# Patient Record
Sex: FEMALE | Race: WHITE | URBAN - METROPOLITAN AREA
[De-identification: names, ages, dates, MRNs, and addresses within clinical notes are randomized per-mention and may not be internally consistent; named-entity substitution may affect disease eponyms.]

---

## 2017-01-01 ENCOUNTER — APPOINTMENT (OUTPATIENT)
Dept: CT IMAGING | Facility: CLINIC | Age: 82
End: 2017-01-01
Attending: FAMILY MEDICINE
Payer: MEDICARE

## 2017-01-01 ENCOUNTER — APPOINTMENT (OUTPATIENT)
Dept: OCCUPATIONAL THERAPY | Facility: CLINIC | Age: 82
End: 2017-01-01
Attending: NURSE PRACTITIONER
Payer: MEDICARE

## 2017-01-01 ENCOUNTER — APPOINTMENT (OUTPATIENT)
Dept: GENERAL RADIOLOGY | Facility: CLINIC | Age: 82
End: 2017-01-01
Attending: FAMILY MEDICINE
Payer: MEDICARE

## 2017-01-01 ENCOUNTER — HOSPITAL ENCOUNTER (OUTPATIENT)
Facility: CLINIC | Age: 82
Setting detail: OBSERVATION
Discharge: HOME | End: 2017-06-04
Attending: FAMILY MEDICINE | Admitting: INTERNAL MEDICINE
Payer: MEDICARE

## 2017-01-01 VITALS
HEART RATE: 91 BPM | SYSTOLIC BLOOD PRESSURE: 140 MMHG | RESPIRATION RATE: 16 BRPM | TEMPERATURE: 97.7 F | WEIGHT: 102.38 LBS | BODY MASS INDEX: 17.57 KG/M2 | DIASTOLIC BLOOD PRESSURE: 80 MMHG | OXYGEN SATURATION: 100 %

## 2017-01-01 DIAGNOSIS — M62.81 GENERALIZED MUSCLE WEAKNESS: ICD-10-CM

## 2017-01-01 DIAGNOSIS — E86.0 DEHYDRATION: ICD-10-CM

## 2017-01-01 DIAGNOSIS — I48.92 ATRIAL FLUTTER, UNSPECIFIED TYPE (H): ICD-10-CM

## 2017-01-01 DIAGNOSIS — R53.1 WEAKNESS GENERALIZED: Primary | ICD-10-CM

## 2017-01-01 DIAGNOSIS — Z91.81 HISTORY OF RECENT FALL: ICD-10-CM

## 2017-01-01 LAB
ALBUMIN SERPL-MCNC: 3.3 G/DL (ref 3.4–5)
ALBUMIN UR-MCNC: 30 MG/DL
ALP SERPL-CCNC: 72 U/L (ref 40–150)
ALT SERPL W P-5'-P-CCNC: 37 U/L (ref 0–50)
ANION GAP SERPL CALCULATED.3IONS-SCNC: 8 MMOL/L (ref 3–14)
ANION GAP SERPL CALCULATED.3IONS-SCNC: 8 MMOL/L (ref 3–14)
ANION GAP SERPL CALCULATED.3IONS-SCNC: 9 MMOL/L (ref 3–14)
APPEARANCE UR: CLEAR
APTT PPP: 33 SEC (ref 22–37)
AST SERPL W P-5'-P-CCNC: 25 U/L (ref 0–45)
BACTERIA #/AREA URNS HPF: ABNORMAL /HPF
BACTERIA SPEC CULT: NO GROWTH
BACTERIA SPEC CULT: NO GROWTH
BASOPHILS # BLD AUTO: 0 10E9/L (ref 0–0.2)
BASOPHILS # BLD AUTO: 0 10E9/L (ref 0–0.2)
BASOPHILS NFR BLD AUTO: 0.2 %
BASOPHILS NFR BLD AUTO: 0.2 %
BILIRUB SERPL-MCNC: 0.9 MG/DL (ref 0.2–1.3)
BILIRUB UR QL STRIP: NEGATIVE
BUN SERPL-MCNC: 26 MG/DL (ref 7–30)
BUN SERPL-MCNC: 33 MG/DL (ref 7–30)
BUN SERPL-MCNC: 41 MG/DL (ref 7–30)
CALCIUM SERPL-MCNC: 8.2 MG/DL (ref 8.5–10.1)
CALCIUM SERPL-MCNC: 8.5 MG/DL (ref 8.5–10.1)
CALCIUM SERPL-MCNC: 9.6 MG/DL (ref 8.5–10.1)
CHLORIDE SERPL-SCNC: 102 MMOL/L (ref 94–109)
CHLORIDE SERPL-SCNC: 106 MMOL/L (ref 94–109)
CHLORIDE SERPL-SCNC: 108 MMOL/L (ref 94–109)
CO2 SERPL-SCNC: 24 MMOL/L (ref 20–32)
CO2 SERPL-SCNC: 24 MMOL/L (ref 20–32)
CO2 SERPL-SCNC: 31 MMOL/L (ref 20–32)
COLOR UR AUTO: YELLOW
CREAT SERPL-MCNC: 0.75 MG/DL (ref 0.52–1.04)
CREAT SERPL-MCNC: 0.75 MG/DL (ref 0.52–1.04)
CREAT SERPL-MCNC: 0.88 MG/DL (ref 0.52–1.04)
DIFFERENTIAL METHOD BLD: ABNORMAL
DIFFERENTIAL METHOD BLD: ABNORMAL
EOSINOPHIL # BLD AUTO: 0 10E9/L (ref 0–0.7)
EOSINOPHIL # BLD AUTO: 0 10E9/L (ref 0–0.7)
EOSINOPHIL NFR BLD AUTO: 0 %
EOSINOPHIL NFR BLD AUTO: 0 %
ERYTHROCYTE [DISTWIDTH] IN BLOOD BY AUTOMATED COUNT: 13.2 % (ref 10–15)
ERYTHROCYTE [DISTWIDTH] IN BLOOD BY AUTOMATED COUNT: 13.8 % (ref 10–15)
GFR SERPL CREATININE-BSD FRML MDRD: 60 ML/MIN/1.7M2
GFR SERPL CREATININE-BSD FRML MDRD: 71 ML/MIN/1.7M2
GFR SERPL CREATININE-BSD FRML MDRD: 72 ML/MIN/1.7M2
GLUCOSE SERPL-MCNC: 100 MG/DL (ref 70–99)
GLUCOSE SERPL-MCNC: 102 MG/DL (ref 70–99)
GLUCOSE SERPL-MCNC: 113 MG/DL (ref 70–99)
GLUCOSE UR STRIP-MCNC: NEGATIVE MG/DL
HCT VFR BLD AUTO: 33.6 % (ref 35–47)
HCT VFR BLD AUTO: 42.2 % (ref 35–47)
HGB BLD-MCNC: 11.4 G/DL (ref 11.7–15.7)
HGB BLD-MCNC: 14.7 G/DL (ref 11.7–15.7)
HGB UR QL STRIP: ABNORMAL
HYALINE CASTS #/AREA URNS LPF: 1 /LPF (ref 0–2)
IMM GRANULOCYTES # BLD: 0 10E9/L (ref 0–0.4)
IMM GRANULOCYTES # BLD: 0 10E9/L (ref 0–0.4)
IMM GRANULOCYTES NFR BLD: 0.3 %
IMM GRANULOCYTES NFR BLD: 0.3 %
INR PPP: 1 (ref 0.86–1.14)
INTERPRETATION ECG - MUSE: NORMAL
KETONES UR STRIP-MCNC: NEGATIVE MG/DL
LACTATE BLD-SCNC: 1.7 MMOL/L (ref 0.7–2.1)
LEUKOCYTE ESTERASE UR QL STRIP: NEGATIVE
LYMPHOCYTES # BLD AUTO: 0.5 10E9/L (ref 0.8–5.3)
LYMPHOCYTES # BLD AUTO: 0.7 10E9/L (ref 0.8–5.3)
LYMPHOCYTES NFR BLD AUTO: 10.7 %
LYMPHOCYTES NFR BLD AUTO: 7.7 %
Lab: NORMAL
MAGNESIUM SERPL-MCNC: 1.6 MG/DL (ref 1.6–2.3)
MCH RBC QN AUTO: 29.9 PG (ref 26.5–33)
MCH RBC QN AUTO: 30.9 PG (ref 26.5–33)
MCHC RBC AUTO-ENTMCNC: 33.9 G/DL (ref 31.5–36.5)
MCHC RBC AUTO-ENTMCNC: 34.8 G/DL (ref 31.5–36.5)
MCV RBC AUTO: 88 FL (ref 78–100)
MCV RBC AUTO: 89 FL (ref 78–100)
MICRO REPORT STATUS: NORMAL
MICRO REPORT STATUS: NORMAL
MONOCYTES # BLD AUTO: 0.5 10E9/L (ref 0–1.3)
MONOCYTES # BLD AUTO: 0.9 10E9/L (ref 0–1.3)
MONOCYTES NFR BLD AUTO: 12.9 %
MONOCYTES NFR BLD AUTO: 8.1 %
NEUTROPHILS # BLD AUTO: 5 10E9/L (ref 1.6–8.3)
NEUTROPHILS # BLD AUTO: 5.5 10E9/L (ref 1.6–8.3)
NEUTROPHILS NFR BLD AUTO: 75.9 %
NEUTROPHILS NFR BLD AUTO: 83.7 %
NITRATE UR QL: NEGATIVE
NRBC # BLD AUTO: 0 10*3/UL
NRBC # BLD AUTO: 0 10*3/UL
NRBC BLD AUTO-RTO: 0 /100
NRBC BLD AUTO-RTO: 0 /100
PH UR STRIP: 6 PH (ref 5–7)
PLATELET # BLD AUTO: 169 10E9/L (ref 150–450)
PLATELET # BLD AUTO: 176 10E9/L (ref 150–450)
POTASSIUM SERPL-SCNC: 3.1 MMOL/L (ref 3.4–5.3)
POTASSIUM SERPL-SCNC: 3.4 MMOL/L (ref 3.4–5.3)
POTASSIUM SERPL-SCNC: 3.6 MMOL/L (ref 3.4–5.3)
POTASSIUM SERPL-SCNC: 4.4 MMOL/L (ref 3.4–5.3)
PROT SERPL-MCNC: 7.5 G/DL (ref 6.8–8.8)
RBC # BLD AUTO: 3.81 10E12/L (ref 3.8–5.2)
RBC # BLD AUTO: 4.75 10E12/L (ref 3.8–5.2)
RBC #/AREA URNS AUTO: 1 /HPF (ref 0–2)
SODIUM SERPL-SCNC: 140 MMOL/L (ref 133–144)
SODIUM SERPL-SCNC: 140 MMOL/L (ref 133–144)
SODIUM SERPL-SCNC: 141 MMOL/L (ref 133–144)
SP GR UR STRIP: 1.02 (ref 1–1.03)
SPECIMEN SOURCE: NORMAL
SPECIMEN SOURCE: NORMAL
SQUAMOUS #/AREA URNS AUTO: <1 /HPF (ref 0–1)
TROPONIN I SERPL-MCNC: NORMAL UG/L (ref 0–0.04)
TSH SERPL DL<=0.005 MIU/L-ACNC: 1.2 MU/L (ref 0.4–4)
URN SPEC COLLECT METH UR: ABNORMAL
UROBILINOGEN UR STRIP-MCNC: NORMAL MG/DL (ref 0–2)
WBC # BLD AUTO: 6.5 10E9/L (ref 4–11)
WBC # BLD AUTO: 6.6 10E9/L (ref 4–11)
WBC #/AREA URNS AUTO: 1 /HPF (ref 0–2)

## 2017-01-01 PROCEDURE — 83735 ASSAY OF MAGNESIUM: CPT | Performed by: NURSE PRACTITIONER

## 2017-01-01 PROCEDURE — 99225 ZZC SUBSEQUENT OBSERVATION CARE,LEVEL II: CPT | Mod: Z6 | Performed by: EMERGENCY MEDICINE

## 2017-01-01 PROCEDURE — 40000141 ZZH STATISTIC PERIPHERAL IV START W/O US GUIDANCE

## 2017-01-01 PROCEDURE — 80048 BASIC METABOLIC PNL TOTAL CA: CPT | Performed by: NURSE PRACTITIONER

## 2017-01-01 PROCEDURE — G0378 HOSPITAL OBSERVATION PER HR: HCPCS

## 2017-01-01 PROCEDURE — 84484 ASSAY OF TROPONIN QUANT: CPT | Performed by: FAMILY MEDICINE

## 2017-01-01 PROCEDURE — 96361 HYDRATE IV INFUSION ADD-ON: CPT

## 2017-01-01 PROCEDURE — 83605 ASSAY OF LACTIC ACID: CPT | Performed by: FAMILY MEDICINE

## 2017-01-01 PROCEDURE — 84132 ASSAY OF SERUM POTASSIUM: CPT | Performed by: INTERNAL MEDICINE

## 2017-01-01 PROCEDURE — 73110 X-RAY EXAM OF WRIST: CPT | Mod: RT

## 2017-01-01 PROCEDURE — 40000164 ZZH STATISTIC PHYSICIAN TLC VISIT: Performed by: INTERNAL MEDICINE

## 2017-01-01 PROCEDURE — 25000128 H RX IP 250 OP 636: Performed by: NURSE PRACTITIONER

## 2017-01-01 PROCEDURE — 25000132 ZZH RX MED GY IP 250 OP 250 PS 637: Performed by: NURSE PRACTITIONER

## 2017-01-01 PROCEDURE — 85730 THROMBOPLASTIN TIME PARTIAL: CPT | Performed by: FAMILY MEDICINE

## 2017-01-01 PROCEDURE — A9270 NON-COVERED ITEM OR SERVICE: HCPCS | Mod: GY | Performed by: NURSE PRACTITIONER

## 2017-01-01 PROCEDURE — 97166 OT EVAL MOD COMPLEX 45 MIN: CPT | Mod: GO | Performed by: OCCUPATIONAL THERAPIST

## 2017-01-01 PROCEDURE — 99285 EMERGENCY DEPT VISIT HI MDM: CPT | Mod: 25

## 2017-01-01 PROCEDURE — 84443 ASSAY THYROID STIM HORMONE: CPT | Performed by: FAMILY MEDICINE

## 2017-01-01 PROCEDURE — 87086 URINE CULTURE/COLONY COUNT: CPT | Performed by: FAMILY MEDICINE

## 2017-01-01 PROCEDURE — 99217 ZZC OBSERVATION CARE DISCHARGE: CPT | Mod: Z6 | Performed by: EMERGENCY MEDICINE

## 2017-01-01 PROCEDURE — 71010 XR CHEST 1 VW: CPT

## 2017-01-01 PROCEDURE — 99207 ZZC APP CREDIT; MD BILLING SHARED VISIT: CPT | Mod: Z6 | Performed by: FAMILY MEDICINE

## 2017-01-01 PROCEDURE — 80053 COMPREHEN METABOLIC PANEL: CPT | Performed by: FAMILY MEDICINE

## 2017-01-01 PROCEDURE — 96360 HYDRATION IV INFUSION INIT: CPT

## 2017-01-01 PROCEDURE — 40000133 ZZH STATISTIC OT WARD VISIT: Performed by: OCCUPATIONAL THERAPIST

## 2017-01-01 PROCEDURE — 85610 PROTHROMBIN TIME: CPT | Performed by: FAMILY MEDICINE

## 2017-01-01 PROCEDURE — 25000128 H RX IP 250 OP 636: Performed by: FAMILY MEDICINE

## 2017-01-01 PROCEDURE — 85025 COMPLETE CBC W/AUTO DIFF WBC: CPT | Performed by: FAMILY MEDICINE

## 2017-01-01 PROCEDURE — 97530 THERAPEUTIC ACTIVITIES: CPT | Mod: GO | Performed by: OCCUPATIONAL THERAPIST

## 2017-01-01 PROCEDURE — 25000132 ZZH RX MED GY IP 250 OP 250 PS 637: Mod: GY | Performed by: NURSE PRACTITIONER

## 2017-01-01 PROCEDURE — 36415 COLL VENOUS BLD VENIPUNCTURE: CPT | Performed by: NURSE PRACTITIONER

## 2017-01-01 PROCEDURE — 81001 URINALYSIS AUTO W/SCOPE: CPT | Performed by: FAMILY MEDICINE

## 2017-01-01 PROCEDURE — 85025 COMPLETE CBC W/AUTO DIFF WBC: CPT | Performed by: NURSE PRACTITIONER

## 2017-01-01 PROCEDURE — 97535 SELF CARE MNGMENT TRAINING: CPT | Mod: GO,59 | Performed by: OCCUPATIONAL THERAPIST

## 2017-01-01 PROCEDURE — 99203 OFFICE O/P NEW LOW 30 MIN: CPT | Mod: GC | Performed by: INTERNAL MEDICINE

## 2017-01-01 PROCEDURE — 87040 BLOOD CULTURE FOR BACTERIA: CPT | Performed by: FAMILY MEDICINE

## 2017-01-01 PROCEDURE — 93005 ELECTROCARDIOGRAM TRACING: CPT

## 2017-01-01 PROCEDURE — 36415 COLL VENOUS BLD VENIPUNCTURE: CPT | Performed by: INTERNAL MEDICINE

## 2017-01-01 PROCEDURE — 99219 ZZC INITIAL OBSERVATION CARE,LEVL II: CPT | Mod: Z6 | Performed by: NURSE PRACTITIONER

## 2017-01-01 PROCEDURE — 70450 CT HEAD/BRAIN W/O DYE: CPT

## 2017-01-01 RX ORDER — ONDANSETRON 2 MG/ML
4 INJECTION INTRAMUSCULAR; INTRAVENOUS EVERY 6 HOURS PRN
Status: DISCONTINUED | OUTPATIENT
Start: 2017-01-01 | End: 2017-01-01 | Stop reason: HOSPADM

## 2017-01-01 RX ORDER — LOVASTATIN 20 MG
20 TABLET ORAL AT BEDTIME
Status: DISCONTINUED | OUTPATIENT
Start: 2017-01-01 | End: 2017-01-01

## 2017-01-01 RX ORDER — POTASSIUM CL/LIDO/0.9 % NACL 10MEQ/0.1L
10 INTRAVENOUS SOLUTION, PIGGYBACK (ML) INTRAVENOUS
Status: DISCONTINUED | OUTPATIENT
Start: 2017-01-01 | End: 2017-01-01

## 2017-01-01 RX ORDER — POTASSIUM CHLORIDE 29.8 MG/ML
20 INJECTION INTRAVENOUS
Status: DISCONTINUED | OUTPATIENT
Start: 2017-01-01 | End: 2017-01-01

## 2017-01-01 RX ORDER — POTASSIUM CHLORIDE 750 MG/1
20-40 TABLET, EXTENDED RELEASE ORAL
Status: DISCONTINUED | OUTPATIENT
Start: 2017-01-01 | End: 2017-01-01

## 2017-01-01 RX ORDER — LORAZEPAM 0.5 MG/1
0.5 TABLET ORAL 2 TIMES DAILY
Status: DISCONTINUED | OUTPATIENT
Start: 2017-01-01 | End: 2017-01-01

## 2017-01-01 RX ORDER — ONDANSETRON 4 MG/1
4 TABLET, ORALLY DISINTEGRATING ORAL EVERY 6 HOURS PRN
Status: DISCONTINUED | OUTPATIENT
Start: 2017-01-01 | End: 2017-01-01 | Stop reason: HOSPADM

## 2017-01-01 RX ORDER — TRIAMTERENE/HYDROCHLOROTHIAZID 37.5-25 MG
1 TABLET ORAL DAILY
Status: DISCONTINUED | OUTPATIENT
Start: 2017-01-01 | End: 2017-01-01

## 2017-01-01 RX ORDER — POTASSIUM CHLORIDE 1.5 G/1.58G
20-40 POWDER, FOR SOLUTION ORAL
Status: DISCONTINUED | OUTPATIENT
Start: 2017-01-01 | End: 2017-01-01

## 2017-01-01 RX ORDER — LIDOCAINE 40 MG/G
CREAM TOPICAL
Status: DISCONTINUED | OUTPATIENT
Start: 2017-01-01 | End: 2017-01-01 | Stop reason: HOSPADM

## 2017-01-01 RX ORDER — SODIUM CHLORIDE 9 MG/ML
1000 INJECTION, SOLUTION INTRAVENOUS CONTINUOUS
Status: DISCONTINUED | OUTPATIENT
Start: 2017-01-01 | End: 2017-01-01

## 2017-01-01 RX ORDER — ACETAMINOPHEN 325 MG/1
650 TABLET ORAL EVERY 4 HOURS PRN
Status: DISCONTINUED | OUTPATIENT
Start: 2017-01-01 | End: 2017-01-01 | Stop reason: HOSPADM

## 2017-01-01 RX ORDER — POTASSIUM CHLORIDE 7.45 MG/ML
10 INJECTION INTRAVENOUS
Status: DISCONTINUED | OUTPATIENT
Start: 2017-01-01 | End: 2017-01-01

## 2017-01-01 RX ADMIN — POTASSIUM CHLORIDE 20 MEQ: 1.5 POWDER, FOR SOLUTION ORAL at 21:13

## 2017-01-01 RX ADMIN — SODIUM CHLORIDE 1000 ML: 9 INJECTION, SOLUTION INTRAVENOUS at 21:38

## 2017-01-01 RX ADMIN — LOVASTATIN 20 MG: 20 TABLET ORAL at 21:13

## 2017-01-01 RX ADMIN — RANITIDINE HYDROCHLORIDE 150 MG: 150 TABLET, FILM COATED ORAL at 21:41

## 2017-01-01 RX ADMIN — METOPROLOL TARTRATE 25 MG: 25 TABLET, FILM COATED ORAL at 08:39

## 2017-01-01 RX ADMIN — SODIUM CHLORIDE 1000 ML: 9 INJECTION, SOLUTION INTRAVENOUS at 18:58

## 2017-01-01 RX ADMIN — METOPROLOL TARTRATE 25 MG: 25 TABLET, FILM COATED ORAL at 21:13

## 2017-01-01 RX ADMIN — SODIUM CHLORIDE 1000 ML: 9 INJECTION, SOLUTION INTRAVENOUS at 12:57

## 2017-01-01 RX ADMIN — SODIUM CHLORIDE 1000 ML: 9 INJECTION, SOLUTION INTRAVENOUS at 05:28

## 2017-01-01 RX ADMIN — METOPROLOL TARTRATE 25 MG: 25 TABLET, FILM COATED ORAL at 20:24

## 2017-01-01 RX ADMIN — SODIUM CHLORIDE 1000 ML: 9 INJECTION, SOLUTION INTRAVENOUS at 15:07

## 2017-01-01 RX ADMIN — METOPROLOL TARTRATE 25 MG: 25 TABLET, FILM COATED ORAL at 09:58

## 2017-01-01 RX ADMIN — SODIUM CHLORIDE 1000 ML: 9 INJECTION, SOLUTION INTRAVENOUS at 12:17

## 2017-01-01 RX ADMIN — METOPROLOL TARTRATE 25 MG: 25 TABLET, FILM COATED ORAL at 09:29

## 2017-01-01 RX ADMIN — RANITIDINE HYDROCHLORIDE 150 MG: 150 TABLET, FILM COATED ORAL at 22:23

## 2017-01-01 RX ADMIN — RANITIDINE HYDROCHLORIDE 150 MG: 150 TABLET, FILM COATED ORAL at 21:13

## 2017-01-01 RX ADMIN — METOPROLOL TARTRATE 25 MG: 25 TABLET, FILM COATED ORAL at 21:41

## 2017-01-01 RX ADMIN — POTASSIUM CHLORIDE 40 MEQ: 1.5 POWDER, FOR SOLUTION ORAL at 17:31

## 2017-01-01 RX ADMIN — LOVASTATIN 20 MG: 20 TABLET ORAL at 22:35

## 2017-01-01 RX ADMIN — LOVASTATIN 20 MG: 20 TABLET ORAL at 22:23

## 2017-01-01 RX ADMIN — LORAZEPAM 0.5 MG: 0.5 TABLET ORAL at 21:41

## 2017-01-01 ASSESSMENT — ENCOUNTER SYMPTOMS
FATIGUE: 1
CHEST TIGHTNESS: 0
SLEEP DISTURBANCE: 0
ABDOMINAL PAIN: 0
APPETITE CHANGE: 1
SHORTNESS OF BREATH: 0
CHOKING: 0
ALTERED MENTAL STATUS: 1
SEIZURES: 0
DYSPHORIC MOOD: 1
NAUSEA: 0
HEADACHES: 0
ARTHRALGIAS: 1
VOMITING: 0
EYE REDNESS: 0
DIFFICULTY URINATING: 1
NECK STIFFNESS: 0
BRUISES/BLEEDS EASILY: 0
WEAKNESS: 1
ACTIVITY CHANGE: 1
COUGH: 0
COLOR CHANGE: 0
SINUS PRESSURE: 0
FREQUENCY: 1
FEVER: 0
DIARRHEA: 0
DECREASED CONCENTRATION: 1
CONFUSION: 1
BLOOD IN STOOL: 0

## 2017-06-01 PROBLEM — R53.1 WEAKNESS GENERALIZED: Status: ACTIVE | Noted: 2017-01-01

## 2017-06-01 NOTE — IP AVS SNAPSHOT
` ` Patient Information     Patient Name Sex     Alicia Cason ([id number]) Female 3/25/1921       Room Bed    4787 3013-02      Patient Demographics     Address Phone    [address] [facility] [address] [phone] (Home)      Patient Ethnicity & Race     Ethnic Group Patient Race    American White      Emergency Contact(s)     Name Relation Home Work Mobile    Tiff Saldivar [phone]      Documents on File        Status Date Received Description       Documents for the Patient    Face Sheet Received () 09     Affiliate Privacy placeholder   phase3    Consent for EHR Access Received 16     Insurance Card       External Medication Information Consent       Patient ID       [facility] Specified Other       Consent for Services/Privacy Notice - Hospital/Clinic Received 16     Privacy Notice - [place] Received 16        Documents for the Encounter    CMS IM for Patient Signature       EMS/Ambulance Record  17 [facility]    CE Point of Care Auth Received        Admission Information     Attending Provider Admitting Provider Admission Type Admission Date/Time    Kiarra Yu MD Hibino, Seikei, MD Emergency 17  1152    Discharge Date Hospital Service Auth/Cert Status Service Area     Emergency Medicine Incomplete [facility] SERVICES    Unit Room/Bed Admission Status       UU U6D OBSERVATION 0713/6513-02 Admission (Confirmed)       Admission     Complaint    Weakness generalized      Hospital Account     Name Acct ID Class Status Primary Coverage    Alicia Cason [id number] Observation Open MEDICARE - MEDICARE FOR HB SUPPLEMENT            Guarantor Account (for Hospital Account #[id number])     Name Relation to Pt Service Area Active? Acct Type    Alicia Cason  FCS Yes Personal/Family    Address Phone          [address] [place] [address] [phone](H)              Coverage Information  (for Hospital Account #[id number])     1. MEDICARE/MEDICARE FOR HB SUPPLEMENT     F/O Payor/Plan Precert #    MEDICARE/MEDICARE FOR HB SUPPLEMENT     Subscriber Subscriber #    Alicia Cason [id number]    Address Phone    [employer] CLAIMS  [address]  [place] [address] [phone]          2. MEDICA/MEDICA PRIME SOLUTION     F/O Payor/Plan Precert #    MEDICA/MEDICA PRIME SOLUTION     Subscriber Subscriber #    Alicia Cason [id number]    Address Phone    [address] [address]  [place] [address] [phone]

## 2017-06-01 NOTE — ED NOTES
Bed: ED04  Expected date: 6/1/17  Expected time: 11:48 AM  Means of arrival:   Comments:  H414  89yo F uti

## 2017-06-01 NOTE — ED NOTES
Pt having difficulty eating dry foods. Drank half ensure, one carton milk, ate 1/2 banana and 1/2 cookie.

## 2017-06-01 NOTE — ED PROVIDER NOTES
History     Chief Complaint   Patient presents with     Altered Mental Status     started having confusion 1 week ago, incontinent of urine, per her daughter same symptoms when she had a UTI     Patient is a 89 year old female presenting with altered mental status. The history is provided by the patient and a relative (daughter). The history is limited by the condition of the patient.   Altered Mental Status   Presenting symptoms: confusion (mild )    Associated symptoms: weakness (generalized)    Associated symptoms: no abdominal pain, no fever, no headaches, no nausea, no rash, no seizures and no vomiting      Alicia Cason is a 89 year old female who presents for evaluation of altered mental status. Patient presents with her daughter who provides collateral history.  Patient's daughter has noticed that the patient has had extreme generalized weakness over the past 1-1.5 weeks. Patient's daughter reports the patient did have an unwitnessed fall 5 days ago, where she found the patient on the floor. Her daughter also reports the patient  is more confused compared to her baseline. Patient has had urinary incontinence and urgency, and her daughter believes she may have a UTI. She has not noted a measured a fever. Her daughter also states the patient has lost a significant amount of weight over the past one year. Patient denies abdominal pain. She does complain of a mild headache as well as right wrist pain.  Daughter feels she could be dehydrated.    I have reviewed the Medications, Allergies, Past Medical and Surgical History, and Social History in the [employer] system.  History reviewed. No pertinent past medical history.    History reviewed. No pertinent surgical history.    No family history on file.    Social History   Substance Use Topics     Smoking status: Never Smoker     Smokeless tobacco: Not on file     Alcohol use No     Current Facility-Administered Medications   Medication     lidocaine 1 % 1 mL      lidocaine (LMX4) kit     sodium chloride (PF) 0.9% PF flush 3 mL     sodium chloride (PF) 0.9% PF flush 3 mL     0.9% sodium chloride infusion     Current Outpatient Prescriptions   Medication     lovastatin (MEVACOR) 20 MG tablet     metoprolol (LOPRESSOR) 25 MG tablet     ranitidine (ZANTAC) 150 MG tablet     LORazepam (ATIVAN PO)     triamterene-hydrochlorothiazide (MAXZIDE-25) 37.5-25 MG per tablet      No Known Allergies    Review of Systems   Unable to perform ROS: Mental status change   Constitutional: Positive for activity change, appetite change and fatigue. Negative for fever.   HENT: Negative for congestion and sinus pressure.    Eyes: Negative for redness and visual disturbance.   Respiratory: Negative for cough, choking, chest tightness and shortness of breath.    Cardiovascular: Negative for chest pain and leg swelling.   Gastrointestinal: Negative for abdominal pain, blood in stool, diarrhea, nausea and vomiting.   Genitourinary: Positive for difficulty urinating and frequency.   Musculoskeletal: Positive for arthralgias (right wrist) and gait problem. Negative for neck stiffness.   Skin: Negative for color change and rash.   Neurological: Positive for weakness (generalized). Negative for seizures, syncope and headaches.   Hematological: Does not bruise/bleed easily.   Psychiatric/Behavioral: Positive for confusion (mild ), decreased concentration and dysphoric mood. Negative for sleep disturbance.   All other systems reviewed and are negative.      Physical Exam      ED Triage Vitals   Enc Vitals Group      BP 06/01/17 1158 140/74      Pulse 06/01/17 1158 97      Resp 06/01/17 1158 16      Temp 06/01/17 1158 99.7  F (37.6  C)      Temp src 06/01/17 1158 Oral      SpO2 06/01/17 1158 100 %      Weight 06/01/17 1158 46.4 kg (102 lb 6 oz)       Physical Exam   Constitutional: She is oriented to person, place, and time. She appears well-developed and well-nourished. She appears distressed.   Patient  generally weak but responsive.   HENT:   Head: Normocephalic and atraumatic.   Mouth/Throat: No oropharyngeal exudate.   Dry mucous membranes   Eyes: Pupils are equal, round, and reactive to light. No scleral icterus.   Neck: Normal range of motion. Neck supple. No tracheal deviation present.   Cardiovascular: Regular rhythm, normal heart sounds and intact distal pulses.    Pulmonary/Chest: Breath sounds normal. No stridor. No respiratory distress.   Abdominal: Soft. Bowel sounds are normal. There is no tenderness. There is no rebound.   Musculoskeletal: She exhibits tenderness. She exhibits no edema.   Right wrist with some swelling although no ecchymoses no specific tenderness   Neurological: She is alert and oriented to person, place, and time. She has normal reflexes. No cranial nerve deficit. Coordination normal.   Nonfocal   Skin: Skin is warm and dry. No rash noted. She is not diaphoretic. No erythema. No pallor.   Psychiatric:   Flat affect   Nursing note and vitals reviewed.      ED Course     ED Course     Patient evaluated in the ER.  Patient received a liter normal saline.  Straight cath urine did not show any infection  Head CT negative  Chest x-ray negative  Right wrist chronic changes  Labs otherwise stable except for BUN to creatinine ratio 44-0.8  Patient reassessed seems to be improving.  Discussed with EP cardiology also feel this point rate control which patient is on metoprolol is the main thing aspirin plus or minus.  Patient to be admitted to ED observation for IV hydration to the night cardiology consult needed continue other medications OT PT evaluate morning also.    Discussed with family also agrees plan.    Procedures       11:56 AM  The patient was seen and examined by Dr. Mario in Room 4.          EKG Interpretation:      Interpreted by Anoop Mario  Time reviewed: 1234  Symptoms at time of EKG: weakness   Rhythm: a flutter  Rate: 100  Axis: normal  Ectopy: none  Conduction:  normal  ST Segments/ T Waves: No hyperacute ST-T wave changes  Q Waves: none  Comparison to prior: a flutter    Clinical Impression: normal EKG          Critical Care time:  none               Labs Ordered and Resulted from Time of ED Arrival Up to the Time of Departure from the ED   CBC WITH PLATELETS DIFFERENTIAL - Abnormal; Notable for the following:        Result Value    Absolute Lymphocytes 0.5 (*)     All other components within normal limits   COMPREHENSIVE METABOLIC PANEL - Abnormal; Notable for the following:     Glucose 113 (*)     Urea Nitrogen 41 (*)     GFR Estimate 60 (*)     Albumin 3.3 (*)     All other components within normal limits   ROUTINE UA WITH MICROSCOPIC - Abnormal; Notable for the following:     Blood Urine Small (*)     Protein Albumin Urine 30 (*)     Bacteria Urine Few (*)     All other components within normal limits   PARTIAL THROMBOPLASTIN TIME   INR   TROPONIN I   LACTIC ACID WHOLE BLOOD   TSH WITH FREE T4 REFLEX   PULSE OXIMETRY NURSING   CARDIAC CONTINUOUS MONITORING   PERIPHERAL IV CATHETER   URINE CULTURE AEROBIC BACTERIAL   BLOOD CULTURE     Results for orders placed or performed during the hospital encounter of 06/01/17   CT Head w/o Contrast    Narrative    CT SCAN OF THE HEAD WITHOUT CONTRAST   6/1/2017 2:17 PM     HISTORY: Altered mental status.    TECHNIQUE:  Axial images of the head and coronal reformations without  IV contrast material. Radiation dose for this scan was reduced using  automated exposure control, adjustment of the mA and/or kV according  to patient size, or iterative reconstruction technique.    COMPARISON: 9/28/2016    FINDINGS:  There is generalized atrophy of the brain.  There is low  attenuation in the white matter of the cerebral hemispheres consistent  with sequelae of small vessel ischemic disease. There is no evidence  of intracranial hemorrhage, mass, acute infarct or anomaly.     The visualized portions of the sinuses and mastoids appear  normal.  There is no evidence of trauma.      Impression    IMPRESSION:   1. No acute abnormality.  2.  Atrophy of the brain.  White matter changes consistent with  sequelae of small vessel ischemic disease. This is unchanged.      LUDWIG IQBAL MD   Wrist XR, G/E 3 views, right    Narrative    XR WRIST RT G/E 3 VW 6/1/2017 2:29 PM    COMPARISON: None.    HISTORY: Trauma      Impression    IMPRESSION: Osteopenia about the right wrist. No displaced fractures  are seen. Significant chondrocalcinosis is noted about the right  carpus, most pronounced at the triangular fibrocartilage.    NANCY PAPPAS   XR Chest 1 View    Narrative    CHEST ONE VIEW   6/1/2017 2:28 PM     HISTORY: Weakness.    COMPARISON: 12/14/2009.    FINDINGS: Patient is rotated and off-center on this view. Left  costophrenic angle is excluded. No gross airspace consolidation. Heart  size similar to prior. No pneumothorax.      Impression    IMPRESSION: Limited exam. No radiographic evidence of acute chest  abnormality.    RUBY MEDINA MD   CBC with platelets differential   Result Value Ref Range    WBC 6.5 4.0 - 11.0 10e9/L    RBC Count 4.75 3.8 - 5.2 10e12/L    Hemoglobin 14.7 11.7 - 15.7 g/dL    Hematocrit 42.2 35.0 - 47.0 %    MCV 89 78 - 100 fl    MCH 30.9 26.5 - 33.0 pg    MCHC 34.8 31.5 - 36.5 g/dL    RDW 13.2 10.0 - 15.0 %    Platelet Count 176 150 - 450 10e9/L    Diff Method Automated Method     % Neutrophils 83.7 %    % Lymphocytes 7.7 %    % Monocytes 8.1 %    % Eosinophils 0.0 %    % Basophils 0.2 %    % Immature Granulocytes 0.3 %    Nucleated RBCs 0 0 /100    Absolute Neutrophil 5.5 1.6 - 8.3 10e9/L    Absolute Lymphocytes 0.5 (L) 0.8 - 5.3 10e9/L    Absolute Monocytes 0.5 0.0 - 1.3 10e9/L    Absolute Eosinophils 0.0 0.0 - 0.7 10e9/L    Absolute Basophils 0.0 0.0 - 0.2 10e9/L    Abs Immature Granulocytes 0.0 0 - 0.4 10e9/L    Absolute Nucleated RBC 0.0    Partial thromboplastin time   Result Value Ref Range    PTT 33 22 - 37 sec    INR   Result Value Ref Range    INR 1.00 0.86 - 1.14   Comprehensive metabolic panel   Result Value Ref Range    Sodium 141 133 - 144 mmol/L    Potassium 3.4 3.4 - 5.3 mmol/L    Chloride 102 94 - 109 mmol/L    Carbon Dioxide 31 20 - 32 mmol/L    Anion Gap 8 3 - 14 mmol/L    Glucose 113 (H) 70 - 99 mg/dL    Urea Nitrogen 41 (H) 7 - 30 mg/dL    Creatinine 0.88 0.52 - 1.04 mg/dL    GFR Estimate 60 (L) >60 mL/min/1.7m2    GFR Estimate If Black 72 >60 mL/min/1.7m2    Calcium 9.6 8.5 - 10.1 mg/dL    Bilirubin Total 0.9 0.2 - 1.3 mg/dL    Albumin 3.3 (L) 3.4 - 5.0 g/dL    Protein Total 7.5 6.8 - 8.8 g/dL    Alkaline Phosphatase 72 40 - 150 U/L    ALT 37 0 - 50 U/L    AST 25 0 - 45 U/L   UA with Microscopic   Result Value Ref Range    Color Urine Yellow     Appearance Urine Clear     Glucose Urine Negative NEG mg/dL    Bilirubin Urine Negative NEG    Ketones Urine Negative NEG mg/dL    Specific Gravity Urine 1.020 1.003 - 1.035    Blood Urine Small (A) NEG    pH Urine 6.0 5.0 - 7.0 pH    Protein Albumin Urine 30 (A) NEG mg/dL    Urobilinogen mg/dL Normal 0.0 - 2.0 mg/dL    Nitrite Urine Negative NEG    Leukocyte Esterase Urine Negative NEG    Source Catheterized Urine     WBC Urine 1 0 - 2 /HPF    RBC Urine 1 0 - 2 /HPF    Bacteria Urine Few (A) NEG /HPF    Squamous Epithelial /HPF Urine <1 0 - 1 /HPF    Hyaline Casts 1 0 - 2 /LPF   Troponin I   Result Value Ref Range    Troponin I ES  0.000 - 0.045 ug/L     <0.015  The 99th percentile for upper reference range is 0.045 ug/L.  Troponin values in   the range of 0.045 - 0.120 ug/L may be associated with risks of adverse   clinical events.     Lactic acid   Result Value Ref Range    Lactic Acid 1.7 0.7 - 2.1 mmol/L   TSH with free T4 reflex   Result Value Ref Range    TSH 1.20 0.40 - 4.00 mU/L   Urine Culture Aerobic Bacterial   Result Value Ref Range    Specimen Description Catheterized Urine     Special Requests Specimen received in preservative     Culture Micro Pending      Micro Report Status Pending    Blood culture   Result Value Ref Range    Specimen Description Blood Left Arm     Culture Micro No growth after 1 hour     Micro Report Status Pending               Assessments & Plan (with Medical Decision Making)  89-year-old female presents with weakness over the last week or so the thought was she possibly have a UTI but negative urinalysis at this point white count was normal.  Does have atrial flutter/fib rate of approximately 100 patient is a symptomatic otherwise head CT chest x-ray right wrist x-ray otherwise negative other labs stable patient appears more dehydrated with BUN to creatinine ratio.  IV hydration discussed with EP cardiology also regarding atrial flutter fib which appears to be new elect to continue hydration to see if that controls rate and continue metoprolol she is on for blood pressure control will admit to ED observation overnight          I have reviewed the nursing notes.    I have reviewed the findings, diagnosis, plan and need for follow up with the patient.    New Prescriptions    No medications on file       Final diagnoses:   Dehydration   Atrial flutter, unspecified type (H)   Generalized muscle weakness   History of recent fall   I, Dottie Meeks, am serving as a trained medical scribe to document services personally performed by Anoop Mario MD, based on the provider's statements to me.   I, Anoop Mario MD, was physically present and have reviewed and verified the accuracy of this note documented by Dottie Meeks.      6/1/2017   [facility] EMERGENCY DEPARTMENT    This note was created at least in part by the use of dragon voice dictation system. Inadvertent typographical errors may still exist.  Anoop Mario MD.         Anoop Mario MD  06/01/17 2636

## 2017-06-01 NOTE — H&P
[facility]   History & Physical    Alicia Cason MRN# [id number]   Age: 89 year old YOB: 1921     Date of Admission: 6/1/2017    Primary Care Provider: Priscilla Inman         Chief Complaint:   AMS/Generalized weakness         History of Present Illness:   Alicia Cason is a 89 year old female w/PMH significant for hypertension, hyperlipidemia, glaucoma, anxiety, hearing loss, and DJD who presented to the ED via ambulance for family's concern that the patient had altered mental status.    In the ED the patient's vital signs were stable, temp 99.7F.  Labs: CMP unremarkable besides BUN 41.  Lactic acid 1.7.  Troponin negative.  TSH 1.20.  Glucose 113.  CBC unremarkable. UA showed small amount of blood, no evidence of infection. Blood culture X 1 done.  Imaging: chest x-ray negative, right wrist x-ray negative for fracture. Chest CT negative for any acute abnormalities.           Review of Systems:   All other systems negative.         Past Medical History:   History reviewed. No pertinent past medical history.          Past Surgical History:    History reviewed. No pertinent surgical history.          Family History:   No family history on file.          Social History:     Social History   Substance Use Topics     Smoking status: Never Smoker     Smokeless tobacco: Not on file     Alcohol use No             Medications:     No current facility-administered medications on file prior to encounter.   Current Outpatient Prescriptions on File Prior to Encounter:  lovastatin (MEVACOR) 20 MG tablet Take 20 mg by mouth At Bedtime   metoprolol (LOPRESSOR) 25 MG tablet Take 25 mg by mouth 2 times daily   ranitidine (ZANTAC) 150 MG tablet Take 150 mg by mouth At Bedtime   LORazepam (ATIVAN PO) Take 0.5 mg by mouth 2 times daily   triamterene-hydrochlorothiazide (MAXZIDE-25) 37.5-25 MG per tablet Take 1 tablet by mouth daily            Allergies:     Allergies   Allergen  Reactions     Atorvastatin Diarrhea     Pravastatin Other (See Comments)     Myalgia's     Prazosin Other (See Comments)     unkown reaction     Simvastatin Diarrhea            Physical Exam:   /74  Pulse 97  Temp 99.7  F (37.6  C) (Oral)  Resp 16  Wt 46.4 kg (102 lb 6 oz)  SpO2 100%  BMI 17.57 kg/m2   GENERAL: Alert and oriented to person, intermittently to events and not oriented to place or why here. NAD.   HEENT: Anicteric sclera. PERRL. Mucous membranes moist and without lesions.   CV: irregularly irregular. No murmurs appreciated.   RESPIRATORY: Effort normal. Lungs CTAB but somewhat diminished, with no wheezing, rales, rhonchi.   GI: Abdomen soft and non distended with normoactive bowel sounds present in all quadrants. No tenderness, rebound, guarding.   MUSCULOSKELETAL: No joint swelling or tenderness. Moves all extremities. Right wrist is slightly swollen compared to left. Good CWMS of right wrist.   NEUROLOGICAL: No focal deficits. Strength 5/5 bilaterally in upper and lower extremities.   EXTREMITIES: No peripheral edema. Intact bilateral pedal pulses.   SKIN: No jaundice. No rashes.          Labs:   CBC:  Recent Labs   Lab Test  06/01/17   1217   WBC  6.5   RBC  4.75   HGB  14.7   HCT  42.2   MCV  89   MCH  30.9   MCHC  34.8   RDW  13.2   PLT  176       CMP:  Recent Labs   Lab Test  06/01/17   1217   NA  141   POTASSIUM  3.4   CHLORIDE  102   JESUS  9.6   CO2  31   BUN  41*   CR  0.88   GLC  113*   AST  25   ALT  37   BILITOTAL  0.9   ALBUMIN  3.3*   PROTTOTAL  7.5   ALKPHOS  72       INR:   Recent Labs   Lab Test  06/01/17   1217   INR  1.00            Imaging:   CT SCAN OF THE HEAD WITHOUT CONTRAST   6/1/2017 2:17 PM      HISTORY: Altered mental status.     TECHNIQUE:  Axial images of the head and coronal reformations without  IV contrast material. Radiation dose for this scan was reduced using  automated exposure control, adjustment of the mA and/or kV according  to patient size, or  iterative reconstruction technique.     COMPARISON: 9/28/2016     FINDINGS:  There is generalized atrophy of the brain.  There is low  attenuation in the white matter of the cerebral hemispheres consistent  with sequelae of small vessel ischemic disease. There is no evidence  of intracranial hemorrhage, mass, acute infarct or anomaly.      The visualized portions of the sinuses and mastoids appear normal.  There is no evidence of trauma.         IMPRESSION:   1. No acute abnormality.  2.  Atrophy of the brain.  White matter changes consistent with  sequelae of small vessel ischemic disease. This is unchanged.        LUDWIG IQBAL MD    CHEST ONE VIEW   6/1/2017 2:28 PM      HISTORY: Weakness.     COMPARISON: 12/14/2009.     FINDINGS: Patient is rotated and off-center on this view. Left  costophrenic angle is excluded. No gross airspace consolidation. Heart  size similar to prior. No pneumothorax.         IMPRESSION: Limited exam. No radiographic evidence of acute chest  abnormality.     RUBY MEDINA MD    XR WRIST RT G/E 3 VW 6/1/2017 2:29 PM     COMPARISON: None.     HISTORY: Trauma     IMPRESSION: Osteopenia about the right wrist. No displaced fractures  are seen. Significant chondrocalcinosis is noted about the right  carpus, most pronounced at the triangular fibrocartilage.     NANCY PAPPAS       Assessment and Plan:   Alicia Cason is a 89 year old female w/PMH significant for hypertension, hyperlipidemia, glaucoma, anxiety, hearing loss, and DJD who presented to the ED via ambulance for family's concern that the patient had altered mental status. She is being admitted under the dehydration protocol.     #. AMS/Weakness: Patient is poor historian and very [place], oriented to self, partially and intermittently will state that she fell, knows where she lives, not entirely sure why she is here. Admission was therefore done via phone with patients daughterLynn (sp). In the past week patient has been  increasingly confused, stating she wants to go home when she is already at home with her daughter/caretaker. Poor appetite and po intake. Dtr thought patient may have UTI as has acted this way and UTI was causative. Patient had an unwitnessed fall yesterday after having increasing difficulty using the walker as she usually does and ambulating fairly well. They have had extra services after a previous hospitalization, bathing, hospice, PT and still gets MOW but the other services stopped due to insurance not covering anymore. Daughter would prefer to continue to care for patient at home with increased services as is hard for her to do all the cares, but would be agreeable to TCU on a limited basis. She will come to the hospital in the AM to discuss. Today in the ER head CT, CXR, right wrist XR, CBC, CMP, Troponin, lactic acid and UA all were negative. BCX pending but no initial growth. Afebrile.EKG showed apparently new onset AF. Cardiology was consulted and rec's were to continue the metoprolol for rate control. IV hydration, observation overnight. YVONNE daughter does not drive.   -Continuous cardiac and pulse oximetry monitoring  -Fall precautions, bed alarm, frequent monitoring  -PT and OT, SW and discharge planning for tomorrow AM  -IVF of NS at 75 cc/hr  -Repeat CBC and BMP in the AM    CHRONIC PROBLEMS:    #. HTN: Takes Metoprolol 25 mg BID, Triamterene-hctz 37.5-25 mg tablet once daily  -Continue medications as at home.   #. Anxiety: Takes 0.5 mg Ativan BID routinely for anxiety.   #. HLD: Takes lovastating 20 mg at bedtime daily  #. GERD: Takes Zantac 150 mg tablet daily    Gris Gonzalez discussed with Dr. Yu.     FEN: Regular diet  Prophylaxis: anticipate short stay  Code Status: DNR/DNI      Signed: Kelly Archuleta, APRN, CNP-BC

## 2017-06-01 NOTE — PHARMACY-ADMISSION MEDICATION HISTORY
"Admission medication history interview status for the 6/1/2017 admission is complete. See Epic admission navigator for allergy information, pharmacy, prior to admission medications and immunization status.     Medication history interview sources:  [facility] [place]    Changes made to PTA medication list (reason)  Added: None   Deleted: None  Changed: Lorazepam 2 times daily changed to lorazepam 2 times daily \"as needed\"    Additional medication history information (including reliability of information, actions taken by pharmacist):  -Patient has altered mental status and no family with her currently  -Called patient's pharmacy to verify her medication list  -Pharmacy claims patient last filled her meds 2 days ago      Prior to Admission medications    Medication Sig Last Dose Taking? Auth Provider   lovastatin (MEVACOR) 20 MG tablet Take 20 mg by mouth At Bedtime 5/31/2017 at Unknown time Yes Unknown, Entered By History   metoprolol (LOPRESSOR) 25 MG tablet Take 25 mg by mouth 2 times daily 5/31/2017 at Unknown time Yes Unknown, Entered By History   ranitidine (ZANTAC) 150 MG tablet Take 150 mg by mouth At Bedtime 5/31/2017 at Unknown time Yes Unknown, Entered By History   LORazepam (ATIVAN PO) Take 0.5 mg by mouth 2 times daily as needed  6/1/2017 at Unknown time Yes Reported, Patient   triamterene-hydrochlorothiazide (MAXZIDE-25) 37.5-25 MG per tablet Take 1 tablet by mouth daily Past Week at Unknown time Yes Reported, Patient         Medication history completed by: Ligia Gutierrez, PD2 Pharmacy Intern    Reviewed by Elana Eugene, PharmD, BCPS     "

## 2017-06-02 NOTE — PROGRESS NOTES
" 06/02/17 1300   Quick Adds   Type of Visit Initial Occupational Therapy Evaluation   Living Environment   Lives With child(patricio), adult   Living Arrangements house   Home Accessibility stairs to enter home;stairs (1 railing present)   Number of Stairs to Enter Home 1   Number of Stairs Within Home 0   Transportation Available family or friend will provide   Living Environment Comment PLOF per chart   Self-Care   Dominant Hand right   Usual Activity Tolerance fair   Current Activity Tolerance poor   Regular Exercise no   Equipment Currently Used at Home walker, rolling   Activity/Exercise/Self-Care Comment PLOF per chart   Functional Level Prior   Ambulation 3-->assistive equipment and person   Transferring 3-->assistive equipment and person   Toileting 3-->assistive equipment and person   Bathing 2-->assistive person   Dressing 2-->assistive person   Eating 2-->assistive person   Communication 2-->difficulty understanding (not related to language barrier)   Swallowing 0-->swallows foods/liquids without difficulty   Cognition 2 - difficulty with organizing thoughts   Fall history within last six months yes   Which of the above functional risks had a recent onset or change? ambulation;transferring;toileting;bathing;dressing;eating;cognition;communication/speech;fall history   Prior Functional Level Comment PLOF per chart   General Information   Onset of Illness/Injury or Date of Surgery - Date 06/01/17   Referring Physician 6/1   Patient/Family Goals Statement none stated   Additional Occupational Profile Info/Pertinent History of Current Problem per chart \"is a 89 year old female who presents for evaluation of altered mental status. Patient presents with her daughter who provides collateral history.  Patient's daughter has noticed that the patient has had extreme generalized weakness over the past 1-1.5 weeks. Patient's daughter reports the patient did have an unwitnessed fall 5 days ago, where she found the patient " "on the floor. Her daughter also reports the patient  is more confused compared to her baseline. Patient has had urinary incontinence and urgency, and her daughter believes she may have a UTI. She has not noted a measured a fever. Her daughter also states the patient has lost a significant amount of weight over the past one year. Patient denies abdominal pain. She does complain of a mild headache as well as right wrist pain.  Daughter feels she could be dehydrated.\"   Precautions/Limitations fall precautions   Cognitive Status Examination   Orientation not oriented to person, place or time   Visual Perception   Visual Perception Comments pt denied   Sensory Examination   Sensory Comments pt denied    Range of Motion (ROM)   ROM Comment OT: appear WFL   Strength   Strength Comments OT: BUE overall deconditioned   Muscle Tone Assessment   Muscle Tone Comments OT: BUE overall deconditioned   Mobility   Bed Mobility Comments OT: Max A x2 supine <> EOB   Transfer Skills   Transfer Comments OT: Max A x2 supine <> EOB   Balance   Balance Comments OT: mod-max A sitting EOB    Instrumental Activities of Daily Living (IADL)   IADL Comments OT: Pt lives w/ daughter per chart   Activities of Daily Living Analysis   Impairments Contributing to Impaired Activities of Daily Living balance impaired;pain;cognition impaired;strength decreased;fear and anxiety   General Therapy Interventions   Planned Therapy Interventions ADL retraining;IADL retraining;balance training;bed mobility training;cognition;strengthening;transfer training;home program guidelines;progressive activity/exercise   Clinical Impression   Criteria for Skilled Therapeutic Interventions Met yes, treatment indicated   OT Diagnosis decreased ind in ADLS/IADLS   Influenced by the following impairments generalized weakness and cognition   Assessment of Occupational Performance 3-5 Performance Deficits   Identified Performance Deficits decreased ind in ADLS/IADLS " "  Clinical Decision Making (Complexity) Moderate complexity   Therapy Frequency 5 times/wk   Predicted Duration of Therapy Intervention (days/wks) 6/5/17   Anticipated Discharge Disposition Long Term Care Facility  (memory care)   Risks and Benefits of Treatment have been explained. Yes   Patient, Family & other staff in agreement with plan of care Yes   [facility] TM \"6 Clicks\"   2016, Trustees of [facility], under license to [employer].  All rights reserved.   6 Clicks Short Forms Daily Activity Inpatient Short Form   [facility]  \"6 Clicks\" Daily Activity Inpatient Short Form   1. Putting on and taking off regular lower body clothing? 1 - Total   2. Bathing (including washing, rinsing, drying)? 1 - Total   3. Toileting, which includes using toilet, bedpan or urinal? 1 - Total   4. Putting on and taking off regular upper body clothing? 1 - Total   5. Taking care of personal grooming such as brushing teeth? 1 - Total   6. Eating meals? 1 - Total   Daily Activity Raw Score (Score out of 24.Lower scores equate to lower levels of function) 6   Total Evaluation Time   Total Evaluation Time (Minutes) 8     "

## 2017-06-02 NOTE — PROGRESS NOTES
Admitted from: [place] ED at 1910  Via: stretcher   Accompanied by: EMS team   Belongings: Placed at bedside, glasses on.   Admission paperwork: Partially complete, will need to be completed when a family member is present   Teaching: Call don't fall, use of console, meal times, visiting hours, orientation to unit, when to call for the RN (angina/sob/dizzyness, etc.), and stressed the importance of safety.   Access: PIV   Telemetry: Placed on pt   Ht./Wt.: complete    Pt arrived with dx of AMS and Dehydration. Updated daughter per phone that pt had arrived to unit, daughter will be present tomorrow, her phone number is on the board and in the patient's chart. Blanchable redness noted on patient's coccyx. Pt complains of tenderness on right wrist and back and posterior head is painful at times no bruising observed. Pt only oriented to self.     Yadi Mosley RN

## 2017-06-02 NOTE — PROGRESS NOTES
Observation goals:    0000:   -diagnostic tests and consults completed and resulted: NO  -vital signs normal or at patient baseline: NO, pt only oriented to self. Unclear if this is baseline for pt.   Nurse to notify provider when observation goals have been met and patient is ready for discharge: YES    0200: diagnostic tests and consults completed and resulted: NO  -vital signs normal or at patient baseline: NO, pt only oriented to self. Unclear if this is baseline for pt.   Nurse to notify provider when observation goals have been met and patient is ready for discharge: YES    0400: diagnostic tests and consults completed and resulted: NO  -vital signs normal or at patient baseline: NO, pt only oriented to self. Unclear if this is baseline for pt.   Nurse to notify provider when observation goals have been met and patient is ready for discharge: YES    0600: diagnostic tests and consults completed and resulted: NO  -vital signs normal or at patient baseline: NO, pt only oriented to self. Unclear if this is baseline for pt.   Nurse to notify provider when observation goals have been met and patient is ready for discharge: YES  Pt only voided small amounts overnight and had one episode of incontinence. Pt IV fluids running overnight. RN will continue to monitor and assess. Pt denies pain. Pt slept intermittently overnight.  Sarahy Contreras

## 2017-06-02 NOTE — CONSULTS
"[facility] Cardiology Consult    Alicia Cason MRN# [id number]   Age: 89 year old YOB: 1921     Date of Admission:  6/1/2017    Reason for consult: \"new onset afib\"       Requesting physician: Kelly Archuleta NP       Level of consult: One-time consult to assist in determining a diagnosis and to recommend an appropriate treatment plan           Assessment and Impression:   Impression:   Atrial fibrillation.  Unclear time of onset.  The patient has remained hemodynamically stable throughout her admission and rate has been stable ranging from 80-100s. She is on home metoprolol 25mg BID, which is controlling her rate appropriately. She has no prior history of afib per EKG review.  She does have a significant history of falls per chart review with 3 admissions related to falls since 2010.  She is not on aspirin or warfarin per pharmacy medication reconciliation note.       Recommendations:   -Continue home metoprolol 25mg PO BID for rate control.   -No need for rhythm control.   -Would not recommend anticoagulation in this patient given other comorbidities/history of falls.  -Could consider echocardiogram to evaluate atrial size.     Cardiology will sign off at this time.  Please do not hesitate to contact us if further questions arise.               Chief Complaint:   Altered mental status.      History is obtained from the electronic health record    Alicia Cason is a 89 y/o F with PMH of anxiety, HTN, HLD, and DJD presenting to the ED via EMS for altered mental status.  She was found by family to be altered from her baseline and EMS was called.  In the ED she was found to be in afib, which was new for her.  BP and pulse remained stable and was afebrile.  Lactate 1.7, troponin negative and TSH WNL at 1.20.  UA with bacteria and small blood only.  CT head without acute abnormality.     She was admitted to ED observation service for further monitoring.      On exam, " she was unable to meaningfully participate in review of systems due to AMS. Cardiology team attempted to call daughter, Tiff, but was unable to reach.          Past Medical History:   HTN, HLD, anxiety, DJD          Past Surgical History:   Surgical history reviewed in CareEverywhere.  Non-contributory to current admission.           Social History:     Social History   Substance Use Topics     Smoking status: Never Smoker     Smokeless tobacco: Not on file     Alcohol use No             Family History:   No family history on file.  Family history unable to be reviewed due to patient condition.           Immunizations:   Immunization status is unknown          Allergies:     Allergies   Allergen Reactions     Atorvastatin Diarrhea     Pravastatin Other (See Comments)     Myalgia's     Prazosin Other (See Comments)     unkown reaction     Simvastatin Diarrhea             Medications:     Current Facility-Administered Medications   Medication     potassium chloride SA (K-DUR/KLOR-CON M) CR tablet 20-40 mEq     potassium chloride (KLOR-CON) Packet 20-40 mEq     potassium chloride 10 mEq in 100 mL intermittent infusion     potassium chloride 10 mEq in 100 mL intermittent infusion with 10 mg lidocaine     potassium chloride 20 mEq in 50 mL intermittent infusion     lidocaine 1 % 1 mL     lidocaine (LMX4) kit     sodium chloride (PF) 0.9% PF flush 3 mL     sodium chloride (PF) 0.9% PF flush 3 mL     lovastatin (MEVACOR) tablet 20 mg     metoprolol (LOPRESSOR) half-tab 25 mg     ranitidine (ZANTAC) tablet 150 mg     acetaminophen (TYLENOL) tablet 650 mg     ondansetron (ZOFRAN-ODT) ODT tab 4 mg    Or     ondansetron (ZOFRAN) injection 4 mg     0.9% sodium chloride infusion             Review of Systems:   The Review of Systems is negative other than noted in the HPI          Physical Exam:     Vitals were reviewed  Patient Vitals for the past 12 hrs:   BP Temp Temp src Pulse Heart Rate Resp SpO2   06/02/17 1611 (!) 142/96  99.3  F (37.4  C) Oral 112 - 16 97 %   06/02/17 1150 111/76 - - 87 - - -   06/02/17 0926 109/67 - - - 92 - -   06/02/17 0808 113/70 97.9  F (36.6  C) Oral - 95 16 96 %     Neck:   supple, symmetrical, trachea midline     Lungs:   No increased work of breathing, good air exchange, clear to auscultation bilaterally, no crackles or wheezing     Cardiovascular:   irregularly irregular rhythm, normal S1 and S2, no S3, no S4, no murmur noted, no edema and pulses 2 plus all extermities bilaterally     Abdomen:   normal bowel sounds, soft, non-distended, non-tender, voluntary guarding absent and no masses palpated     Constitutional:   awake, alert, minimally following directions, not answering questions appropriately and no apparent distress             Data:   All laboratory and imaging data in the past 24 hours reviewed  Lab Results   Component Value Date    WBC 6.6 06/02/2017    HGB 11.4 (L) 06/02/2017    HCT 33.6 (L) 06/02/2017     06/02/2017     06/02/2017    POTASSIUM 3.1 (L) 06/02/2017    CHLORIDE 106 06/02/2017    CO2 24 06/02/2017    BUN 33 (H) 06/02/2017    CR 0.75 06/02/2017     (H) 06/02/2017    TROPI  06/01/2017     <0.015  The 99th percentile for upper reference range is 0.045 ug/L.  Troponin values in   the range of 0.045 - 0.120 ug/L may be associated with risks of adverse   clinical events.      AST 25 06/01/2017    ALT 37 06/01/2017    ALKPHOS 72 06/01/2017    BILITOTAL 0.9 06/01/2017    INR 1.00 06/01/2017        EKG results:   I have reviewed this patient's EKG with the following interpretation per my read:        Afib, rate 100, QRS 74, QTc 420, no ST elevations/depressions or T-wave inversions per my read.       Echo 9/28/16  Technically difficult study.  Global and regional left ventricular function is hyperkinetic with an EF  >70%. This causes a resting intra-cavitary gradient of 35 mmHg.  Right ventricular function, chamber size, wall motion, and thickness are  normal.  Mild  mitral stenosis is present.    Imaging studies per radiology report.      CT head 6/1/17  IMPRESSION:   1. No acute abnormality.  2.  Atrophy of the brain.  White matter changes consistent with  sequelae of small vessel ischemic disease. This is unchanged.    CXR 6/1/17  FINDINGS: Patient is rotated and off-center on this view. Left  costophrenic angle is excluded. No gross airspace consolidation. Heart  size similar to prior. No pneumothorax.         IMPRESSION: Limited exam. No radiographic evidence of acute chest  abnormality.    Patient seen and discussed with Dr. Soni.     Severo Newberry MD   PGY-2, IM  Pager: [phone]

## 2017-06-02 NOTE — PROGRESS NOTES
Social Work Services Progress Note    Hospital Day: 2  Collaborated with:  MCKENNA Collins CC; JUDY Lobo    Data:  SW consulted for discharge planning.  Pt may need LTC placement or increased services at home.  Team anticipates that pt is not appropriate for TCU given her cognitive impairment and inability to learn techniques by therapies.    Intervention:  Discussed case with MCKENNA Collins CC.  She has contacted pt's dtr and plan is to return home with home care services.    Assessment:  No SW needs identified.    Plan:    Anticipated Disposition:  Home with services    Barriers to d/c plan:  Discharging home today.    Follow Up:  SW available if plan changes or other needs arise.    ARTUR Wall covering for 6D/ED  [phone] phone  [phone] pager

## 2017-06-02 NOTE — PLAN OF CARE
Problem: Goal Outcome Summary  Goal: Goal Outcome Summary  PT 6D: Orders received. Per chart review and discussion with interdisciplinary team, pt does not require skilled inpatient physical therapy at this time. OT evaluated pt and recommending LTC, memory care, or increased services at home. Safe discharge plan established. No need for PT evaluation in addition to OT eval, given OBS status. Please re-consult as needed if patient experiences a change in functional mobility or goals requiring skilled inpatient physical therapy.

## 2017-06-02 NOTE — PROGRESS NOTES
Short Blessed Test completed:    Pt scored 28/28.    A screening test in itself is insufficient to diagnose a dementing disorder. The SBT is, however,  quite sensitive to early cognitive changes associated with Alzheimer s disease. Scores in the  impaired range (see below) indicate a need for further assessment. Scores in the  normal  range  suggest that a dementing disorder is unlikely, but a very early disease process cannot be ruled out.  More advanced assessment may be warranted in cases where other objective evidence of  impairment exists.  ?? In the original validation sample for the SBT (Cuate et al., 1983), 90% of normal scores 6  points or less. Scores of 7 or higher would indicate a need for further evaluation to rule out a  dementing disorder, such as Alzheimer s disease.  ?? Based on clinical research findings from the Memory and Aging Project, the following cut  points may also be considered:  o 0 - 4 Normal Cognition  o 5 - 9 Questionable Impairment (evaluate for early dementing disorder)  o 10 or more Impairment Consistent with Dementia (evaluate for dementing disorder)

## 2017-06-02 NOTE — CONSULTS
[facility], [place]  Palliative Care Consultation    Alicia Cason MRN# [id number]   Age: 89 year old YOB: 1921   Date of Admission: 6/1/2017    Reason for consult: Discharge planning               Recommendations        I spoke with Karinapee Gusmanrenetta and patient's daughter, Tiff Saldivar.  Tiff tells me that her mother has had significantly declining health for the past 3+ weeks, with increased confusion, weakness, disorientation. Tiff tells me that her mom would never want to be in a nursing home but that she needs more help at home in order to care for her mom. We discussed hospice care vs increased home care and Tiff tells me that her mother would want hospice care, would not want life prolonging measures, would not want to be readmitted to the hospital, would want symptoms managed and would want to be at home for whatever time she has left. Knowing this, Karina reached out to [facility] and they are going to meet with Tiff this weekend and discussed home hospice in more detail and care coordinators /  will work with family to help increase care at home if possible.     Palliative care will sign off for now. Please feel free to re-consult us if needs arise.     Coordination of Care     Findings & plan of care discussed with:  Karina Matias and Sharda Hayden    Thank you for the opportunity to continue to participate in the care of this patient and family.  Please feel free to contact on-call palliative provider with any emergent needs.  We can be reached via team pager [phone] (answered 8-4:30 Monday-Friday); after-hours answering service ([phone]); Main Palliative Clinic - [phone]      Vee Blandon MD / Palliative Medicine / Pager [phone]

## 2017-06-02 NOTE — PLAN OF CARE
Problem: Goal Outcome Summary  Goal: Goal Outcome Summary  OT: 6D: OT eval completed, see previous note for SBT scoring, Pt  Mod-max A x1-2 functional transfers w/w anxiety throughout.   Rec: LTC w/ memory care pending, per note family stating pt would not want memory care, if pt were to discharge home, recommend family training w/ OT and pt to have 24/7 assist.

## 2017-06-02 NOTE — PROGRESS NOTES
[facility]  Patient is currently open to home care services with [facility].  The patient is currently receiving private pay services for toe nail care services.  [facility]  and team have been notified of patient admission to OBSERVATION STATUS.  [facility] liaison will continue to follow patient during stay.  If appropriate provide orders to resume home care at time of discharge.    Mehnaz Adams RN, BSN  [facility] Liaison  [phone]

## 2017-06-02 NOTE — PROGRESS NOTES
"Social Work Services Progress Note    Hospital Day: 2    Collaborated with:  Lashell NP; MCKENNA Collins CC    Data:  SW referred by team for assistance with discharge planning and possible hospice services.    Intervention:  Karina tells writer that she has spoken with pt's dtr twice re: d/c planning options.  Pt apparently has had a significant change in status, requiring total care (including being fed) and 24-hr supervision.  Pt scored 28/28 on the SBT, suggesting \"a need for further evaluation to rule out a dementing disorder, such as Alzheimer's disease\" per OT note.  (Scores 0 - 4 Normal Cognition  o 5 - 9 Questionable Impairment (evaluate for early dementing disorder)  o 10 or more Impairment Consistent with Dementia (evaluate for dementing disorder)   Per nursing, pt is A & O x 1.  Dtr had informed Karina earlier that pt was much more independent in the past few weeks which indicates a major change in her status.  Team wondering if pt meets hospice criteria to help provide more services to the pt and family.  Karina talked with dtr, Tiff, re: long-term care placement and has asked that the weekend SW check in with the pt's dtr on Saturday morning for a follow up discussion.  Karina informed writer that during their first conversation, the dtr indicated they were not interested in applying for MA as they do not want pt to lose her home as both the dtr and son live in the home.  Pt appears to be well-cared for at home though there is concern from the team that family is experiencing caregiver burnout.    Assessment:  SW to continue assisting with d/c planning and hospice discussion, as needed.    Plan:    Anticipated Disposition:  to be determined    Barriers to d/c plan:  Securing safe d/c plan.    Follow Up:  SW will continue to follow.    ARTUR Wall covering for 6D/ED  [phone] phone  [phone] pager        "

## 2017-06-02 NOTE — PROGRESS NOTES
"  Care Coordinator Progress Note     Admission Date/Time:  6/1/2017  Attending MD:  Kiarra Yu MD     Data  Chart reviewed, discussed with interdisciplinary team.   Patient was admitted for:    Dehydration  Atrial flutter, unspecified type (H)  Generalized muscle weakness  History of recent fall.    Concerns with insurance coverage for discharge needs: None.  Current Living Situation: Patient lives with family.  Support System: Supportive  Services Involved: Home Care  Transportation: undetermined  Barriers to Discharge: chronically ill, cognitively impaired and dependent with mobility/activities of daily living    Coordination of Care and Referrals: Provided patient/family with options for Home Care.        Assessment  Patient currently has limited services with [facility].  I spoke with her daughter Tiff who stated that she was much more independent in past few weeks. Per Tiff, patient can not private pay for a TCU stay or Long term Care, she is not interested in applying for MA.  Plan is for patient to go home with increased services from home care.      Plan  Anticipated Discharge Date: 06/02/17    Anticipated Discharge Plan:  Home with increased services      Karina Matias RN      1315: Spoke with Tiff again, they have stairs entering their house.  Tiff says she will need a ride home and assistance getting into the house.    1400: After discussing the patient with beside RN and OT, it is very concerning how this patient will function at home.  Tiff states it is difficult but she doesn't want her to go to a LTC. I asked Tiff how she will care for her.  She started crying and said \" I don't know I have to talk to my brother\"  She asked if she could call me back.    1530: Spoke with Tiff she is considering a Long term care facility. SW will call her tomorrow.        "

## 2017-06-02 NOTE — PLAN OF CARE
Problem: Discharge Planning  Goal: Discharge Planning (Adult, OB, Behavioral, Peds)  1. diagnostic tests and consults completed and resulted-Not yet    2. vital signs normal or at patient baseline-Yes     Pt repositioned for comfort and to ease the pressure on reddened coccyx. Ice cream eaten with no complications. IVMF at 75mL/hr. Continue to monitor and assess with reports of concerns to team.      Yadi Mosley RN 06/01/17 9:27 PM

## 2017-06-02 NOTE — PROGRESS NOTES
Observation Unit Progress Note    Date of Encounter: 6/2/17    Observation Staff Physician: Dr. Mehta    Reason for Admission: AMS/Weakness      Observation Goals:  1. diagnostic tests and consults completed and resulted  2. vital signs normal or at patient baseline  3. PT/OT consult completed  4. safe disposition identified      Plan of care for shift:  Alicia Cason is a 89 year old female w/PMH significant for hypertension, hyperlipidemia, glaucoma, anxiety, hearing loss, and DJD who presented to the ED via ambulance for family's concern that the patient had altered mental status. She is being admitted under the dehydration protocol.      1. AMS/Weakness: Patient is poor historian and very [place], oriented to self. Not aware of where she is or the year. Per chart review, the patient has not been seen by her PCP since 5/15. Patient's PCP Dr. Inman continues to refill patient's prescriptions. Tried to get a hold of Dr. Inman, but she was not in today. Spoke with her nurse who did not have much information on the patient aside from reading the chart. No notes about patient's baseline mental status. No known diagnosis of dementia upon chart review. Though a note on 5/30 alludes to some discussion about patient being too weak to leave home and a hospice referral to be placed in the future.  Daughter reports 3-4 weeks of increased confusion and weakness. The daughter felt the symptoms may have been related to UTI, UA was negative. Patient also has had poor appetite and po intake.  Palliative care discussed with patient's daughter the possibility of home hospice to help with cares at home and would not life prolonging measures. [facility] will meet with patient tomorrow. Patient is DNR/DNI.  ED Work up included negative head CT, negative CXR, right wrist XR with no fracture noted, WBC normal, Hgb stable  Cr stable, Troponin negative lactic acid 1.7.  BCX NTD.  Afebrile. IV hydration, observation overnight.  FYI daughter does not drive. OT saw the patient today Max assist of 2. OT eval  SBT scoring 28/28 which shows impairment consistent with dementia and quite sensitive to early cognitive changes associated with Alzheimer s disease. Scores in the impaired range indicate a need for further assessment. Rec: LTC w/ memory care pending, per note family stating pt would not want memory care, if pt were to discharge home, recommend family training w/ OT and pt to have 24/7 assist.  - [facility] to follow patient tomorrow.   -Continuous cardiac and pulse oximetry monitoring  -Fall precautions, bed alarm, frequent monitoring  -IVF of NS at 75 cc/hr  -Repeat BMP in the AM    2. Atrial Fibrillation - Unsure of onset. Heart rate has been stable ranging from 80-100s. EKG showed Atrial fibrillation in ED. Cardiology was consulted and recommended home metoprolol 25 mg PO BID. Did not recommended anticoagulation due to patient's co morbidities and frequent falls.   - Continue Metoprolol 25 mg BID    3. Hypokalemia - Potassium 3.1. Patient receiving replacements and will have a potassium rechecked.      CHRONIC PROBLEMS:     #. HTN: Takes Metoprolol 25 mg BID, Triamterene-hctz 37.5-25 mg tablet once daily  -Continue medications as at home.   #. Anxiety: Takes 0.5 mg Ativan BID routinely for anxiety.   #. HLD: Takes lovastating 20 mg at bedtime daily  #. GERD: Takes Zantac 150 mg tablet daily    Disposition:  1. Home with home hospice.     Exam:  Physical Exam   GENERAL: Alert and oriented to person, intermittently to events and not oriented to place or why here. NAD.   HEENT: Anicteric sclera. PERRL. Mucous membranes moist and without lesions.   CV: irregularly irregular. No murmurs appreciated.   RESPIRATORY: Effort normal. Lungs CTAB but somewhat diminished, with no wheezing, rales, rhonchi.   GI: Abdomen soft and non distended with normoactive bowel sounds present in all quadrants. No tenderness, rebound, guarding.    MUSCULOSKELETAL: No joint swelling or tenderness. Moves all extremities. Right wrist is slightly swollen compared to left. Good CWMS of right wrist.   NEUROLOGICAL: No focal deficits. Strength 5/5 bilaterally in upper and lower extremities.   EXTREMITIES: No peripheral edema. Intact bilateral pedal pulses.   SKIN: No jaundice. No rashes.         Pertinent Labs/tests:  Potassium 3.1.

## 2017-06-03 NOTE — PLAN OF CARE
Problem: Discharge Planning  Goal: Discharge Planning (Adult, OB, Behavioral, Peds)  Outpatient/Observation goals to be met before discharge home:  -Diagnostic tests and consults completed and resulted: No  -Vital signs normal or at patient baseline: Yes  - PT/OT consult completed: OT consult and recommendation completed. PT consult pending.  - Safe disposition identified: No pending work and care coordination

## 2017-06-03 NOTE — PLAN OF CARE
Problem: Discharge Planning  Goal: Discharge Planning (Adult, OB, Behavioral, Peds)  Outpatient/Observation goals to be met before discharge home:     1. diagnostic tests and consults completed and resulted - YES  2. vital signs normal or at patient baseline - YEs  3. PT/OT consult completed - NO  4. safe disposition identified - NO    Nurse to notify provider when observation goals have been met and patient is ready for discharge

## 2017-06-03 NOTE — PROGRESS NOTES
Social Work Services Progress Note    Hospital Day: 3  Collaborated with:  JUDY Perrin; Phong bedside RN; Tiff, pt's dtr via phone [phone]    Data:  SW consulted for hospice.  Discussions yesterday into last evening were leading towards going home with hospice.  SW to call dtr today to discuss plan further.  JUDY Perrin, states that pt is ready to d/c today but needs a safe plan in place.  Justin, On-Call RN at [facility], [phone], states they cannot do an informational meeting at the hospital nor could admit pt at home today, but do have openings to admit a new pt tomorrow (9am and 1pm).      Intervention:  Chart reviewed.  Discussed pt's current status with JUDY Perrin, and Phong, bedside RN.  SW called and discussed d/c planning with dtr, Tiff.  ROBER educated Tiff re: hospice services and where they can be provided.  ROBER informed Tiff that hospice cannot provide 24-hr care, but intermittent, short visits throughout the week. Tiff states that both she and her brother (pt's son) Ruben Cason, are available and can provide 24-hr care.  ROBER informed Tiff of pt's current care needs (total care, Max assist of 1-2 per OT note) and Tiff states that she and her brother have performed this care on pt in the past and feel comfortable providing it again now.  We discussed possible equipment needs they have.  Tiff declines a hospital bed stating they would not have room for it in the house.  She anticipates pt can be in her own bed, which is on the main floor of the home.  Pt's bathroom is also on the main floor.  Tiff thinks a commode would be helpful, though pt has not been out of bed during this hospitalization.  No other equipment needs at this time.  Hospice team can assess further.  Equipment can be ordered through hospice and be delivered to the home in just 4 hours.     Tiff voices that she is in full support of a hospice plan--feels it aligns with pt's wishes.  Tiff confirms that she and her brother want pt to  return home on hospice.  We discussed various hospice agencies, namely [facility] and [facility], as pt has received care from both of these entities.  Tiff chooses [facility].  ROBER informed Tiff that if caring for pt gets too overwhelming the hospice ROBER can help facilitate an admission to a facility for 24-hr nursing care on hospice.   Tiff states that she has also reached out to [facility] and they are supposed to call her back on Monday.  She states they provide sliding-fee scale nursing care.    ROBER informed Tiff that pt is stable to d/c home today, however since  Hospice cannot admit pt until tomorrow, it would be safest to wait to d/c pt until tomorrow.  Tiff agrees.  ROBER informed Tiff that pt will require stretcher transport upon d/c and it is unknown how much insurance will cover this cost.  Pt/family may incur private pay charges for transportation.  Tiff voices understanding.   ROBER informed Tiff that therapy recommends family come into the hospital to do family training on how to provide care to pt.  Tiff politely declines stating she and brother know how to provide the care pt needs.  ROBER informed Tiff that if needed, PT could be consulted while on hospice for a session on family training in the home.     ROBER arranged a stretcher ride with [facility] for  at 12noon on Sunday 6/4/17.      ROBER called and updated Justin, On-Call [facility] RN, who states that the hospice team will be out to pt's house tomorrow, Sunday at 1:00pm to complete the admission.    Assessment:  Pt to d/c home on Sunday at 12noon with admission to hospice services following at 1300.    Plan:    Anticipated Disposition:  Home with services    Barriers to d/c plan:  None.    Follow Up:  ROBER will continue to follow.    ARTUR Wall  Weekend   [phone] pager 8:00am-4:30pm  [phone] After-hours pager

## 2017-06-03 NOTE — PROGRESS NOTES
Problem: Discharge Planning  Goal: Discharge Planning (Adult, OB, Behavioral, Peds)  Outpatient/Observation goals to be met before discharge home:      1. diagnostic tests and consults completed and resulted - YES  2. vital signs normal or at patient baseline - YES  3. PT/OT consult completed - NO, PT has not seen pt. OT consulted and recommending LTC w/ Memory Care requiring 24/7 assist.  4. safe disposition identified - NO, Care coordinator had multiple conversations with family, no plan in place yet.     Nurse to notify provider when observation goals have been met and patient is ready for discharge

## 2017-06-03 NOTE — PROGRESS NOTES
[facility]  Daily Progress Note          Assessment & Plan:   Alicia Cason is a 89 year old female w/PMH significant for hypertension, hyperlipidemia, glaucoma, anxiety, hearing loss, and DJD who presented to the ED via ambulance due to family report of AMS.       1. AMS/Weakness: Daughter reports 3-4 weeks of increased confusion and weakness.  Initially thought to be related to UTI. However, UA negative.  Work up here included negative head CT, negative CXR, right wrist XR with no fracture noted, WBC normal, Hgb stable,  Cr stable, Troponin negative lactic acid 1.7.  BCX NTD. OT eval showed  SBT score of 28/28 which shows impairment consistent with dementia; scores in the impaired range indicate a need for further assessment. OT  recommended LTC with memory care. If pt were to discharge home, recommend family training w/ OT and pt to have 24/7 assist. Per daughter, patient would not want LTC.  -Palliative care consulted yesterday and discussed goals of care with patient's daughter. Home hospice was discussed. Plan for [facility] to meet with patient/daughter today. Code status is DNR/DNI.  -Fall precautions, bed alarm, frequent monitoring  -IVF of NS at 75 cc/hr      2. Atrial Fibrillation: Unsure of onset. Heart rate has been stable ranging from 80-100s. EKG showed Atrial fibrillation in ED. Cardiology was consulted and recommended home metoprolol 25 mg PO BID. Did not recommended anticoagulation due to patient's co morbidities and frequent falls.   - Continue Metoprolol 25 mg BID     3. Hypokalemia:   -Resolved with replacement      CHRONIC PROBLEMS:      #. HTN: Takes Metoprolol 25 mg BID, Triamterene-hctz 37.5-25 mg tablet once daily  -Continue medications as at home.   #. Anxiety:  Takes 0.5 mg Ativan BID PRN for anxiety.   #. HLD: lovastatin 20 mg at bedtime daily  #. GERD:  Zantac 150 mg tablet daily     FEN: Regular diet  Lines: PIV   Prophylaxis: Ambulation           Consults:   ROBER/DORA  Palliative Care  Hospice   PT/OT         Discharge Planning:   LTC versus home with hospice         Interval History:   Resting in bed. No complaints at this time. Confused.          Physical Exam:   /90 (BP Location: Right arm)  Pulse 104  Temp 99.3  F (37.4  C) (Oral)  Resp 16  Wt 46.4 kg (102 lb 6 oz)  SpO2 95%  BMI 17.57 kg/m2     GENERAL: Oriented x 1, alert. NAD.   HEENT: Anicteric sclera. Mucous membranes moist.   CV: Irregularly, irregular,  No murmurs appreciated.   RESPIRATORY: Effort normal. Lungs CTAB with no wheezing, rales, rhonchi.   GI: Abdomen soft and non distended with normoactive bowel sounds present in all quadrants. No tenderness, rebound, guarding.   NEUROLOGICAL:  Moves all extremities.    EXTREMITIES: No peripheral edema. Intact bilateral pedal pulses.   SKIN: No jaundice. No rashes.     Medication list reviewed.   Today's labs and imaging were reviewed.     ISHA Peralta, CNP  Emergency Department Observation Unit    Addendum 1030: Discussed case with ROBER. Unable to have formal Hospice consult in the hospital over the weekend.  ROBER had a discussion with patient's daughter this am. Daughter wants the patient to go home with hospice and doesn't feel she needs to meet with them prior to discharge. Offered OT training, per OT recommendations. The daughter declines as she states they already know how to care for her. Hospice available to take new patients tomorrow. Plan for patient to discharge home at noon tomorrow (via stretcher). Hospice to meet the patient at her home tomorrow at 1 pm. The patient's daughter and son live with her and provide 24 hour assistance.     ISHA Peralta, CNP  Emergency Department Observation Unit

## 2017-06-03 NOTE — DISCHARGE SUMMARY
ED Observation Discharge Summary    Alicia Caosn   MRN# [id number]  Age: 89 year old   YOB: 1921            Date of Admission:  6/1/2017    Date of Discharge:  6/4/2017  Admitting Physician:  Kiarra Yu MD  Discharge Physician: Dr. Chuy MD  NP/PA: Marychuy Waller CNP        DISCHARGE DIAGNOSIS:   1. AMS/Weakness  2. Atrial Fibrillation   3. Hypertension  4. Anxiety  5. HLD  6. GERD    INTERVAL HISTORY: VSS, afebrile. Remains altered this am. Discussed plan of care with daughter, Tiff, this am. She feels ready to care for the patient at home. Feels comfortable with her cares. Agrees with plan to discharge to home. All questions answered.         PHYSICAL EXAM:   Blood pressure 140/80, pulse 91, temperature 97.7  F (36.5  C), temperature source Oral, resp. rate 16, weight 46.4 kg (102 lb 6 oz), SpO2 100 %, not currently breastfeeding.   GENERAL: Oriented x 1, alert. NAD.   HEENT: Anicteric sclera. Mucous membranes moist.   CV: Irregularly, irregular,  No murmurs appreciated.   RESPIRATORY: Effort normal. Lungs CTAB with no wheezing, rales, rhonchi.   GI: Abdomen soft and non distended with normoactive bowel sounds present in all quadrants. No tenderness, rebound, guarding.   NEUROLOGICAL:  Moves all extremities.    EXTREMITIES: No peripheral edema. Intact bilateral pedal pulses.   SKIN: No jaundice. No rashes.     PROCEDURES AND IMAGING:   No results found for this or any previous visit (from the past 24 hour(s)).  DISCHARGE MEDICATIONS:   Current Discharge Medication List      CONTINUE these medications which have NOT CHANGED    Details   metoprolol (LOPRESSOR) 25 MG tablet Take 25 mg by mouth 2 times daily      ranitidine (ZANTAC) 150 MG tablet Take 150 mg by mouth At Bedtime      LORazepam (ATIVAN PO) Take 0.5 mg by mouth 2 times daily as needed          STOP taking these medications       lovastatin (MEVACOR) 20 MG tablet Comments:   Reason for Stopping:          "triamterene-hydrochlorothiazide (MAXZIDE-25) 37.5-25 MG per tablet Comments:   Reason for Stopping:                 CONSULTATIONS:   Consultation during this admission received from:  SW/CC  Palliative Care  Hospice   PT/OT     BRIEF HISTORY OF PRESENT ILLNESS:   (Adopted from admission H&P).    \"Alicia Cason is a 89 year old female w/PMH significant for hypertension, hyperlipidemia, glaucoma, anxiety, hearing loss, and DJD who presented to the ED via ambulance for family's concern that the patient had altered mental status.    In the ED the patient's vital signs were stable, temp 99.7F.  Labs: CMP unremarkable besides BUN 41.  Lactic acid 1.7.  Troponin negative.  TSH 1.20.  Glucose 113.  CBC unremarkable. UA showed small amount of blood, no evidence of infection. Blood culture X 1 done.  Imaging: chest x-ray negative, right wrist x-ray negative for fracture. Chest CT negative for any acute abnormalities.\"    ED OBSERVATION COURSE: Alicia Cason is a 89 year old female w/PMH significant for hypertension, hyperlipidemia, glaucoma, anxiety, hearing loss, and DJD who presented to the ED via ambulance due to family report of AMS.       1. AMS/Weakness: Daughter reports 3-4 weeks of increased confusion and weakness.  Initially thought to be related to UTI. However, UA negative.  Work up here included negative head CT, negative CXR, right wrist XR with no fracture noted, WBC normal, Hgb stable,  Cr stable, Troponin negative lactic acid 1.7.  BCX NTD. OT eval showed  SBT score of 28/28 which shows impairment consistent with dementia; scores in the impaired range indicate a need for further assessment. OT  recommended LTC with memory care. However, the patient's daughter stated the patient would not want to go to LTC. Per OT,  If pt were to discharge home, recommend family training w/ OT and pt to have 24/7 assist. Per patient's family, they feel comfortable caring for the patient at home and have already been " performing cares. Decline need for OT training. They can provide 24 hours assist with cares. Palliative care was consulted and discussed goals of care with patient's daughter. Home hospice was discussed. Unable to have formal Hospice consult in the hospital this weekend. The patient's daughter declined need for formal consult here and wished to discharge to home with hospice. Plan to discharge at noon and hospice to meed the patient and her family at her home at 1 PM.  Code status is DNR/DNI.        2. Atrial Fibrillation: Unsure of onset. Heart rate has been stable ranging from 80-100s. EKG showed Atrial fibrillation in ED. Cardiology was consulted and recommended home metoprolol 25 mg PO BID. Did not recommended anticoagulation due to patient's co morbidities and frequent falls.  Continue Metoprolol 25 mg BID      3. Hypokalemia: Resolved with replacement      CHRONIC PROBLEMS:      #. HTN: Resume Takes Metoprolol 25 mg BID. Can stop Triamterene-hctz 37.5-25 mg tablet once daily given Hospice status.   #. Anxiety:  Takes 0.5 mg Ativan BID PRN for anxiety.   #. HLD: Can stop lovastatin 20 mg at bedtime daily given hospice status   #. GERD:  Zantac 150 mg tablet daily      DISCHARGE DISPOSITION:   Discharged to home with home hospice.     DISCHARGE INSTRUCTIONS AND FOLLOW-UP:    Discharge Procedure Orders  Home care nursing referral   Referral Type: Home Health Therapies & Aides     MD face to face encounter   Order Comments: Documentation of Face to Face and Certification for Home Health Services    I certify that patient: Alicia Cason is under my care and that I, or a nurse practitioner or physician's assistant working with me, had a face-to-face encounter that meets the physician face-to-face encounter requirements with this patient on: June 2, 2017.    This encounter with the patient was in whole, or in part, for the following medical condition, which is the primary reason for home health care: urinary tract  infection.    I certify that, based on my findings, the following services are medically necessary home health services: Nursing.    My clinical findings support the need for the above services because: Nurse is needed: For complex aftercare of surgical procedures because the patient needs instruction and cannot perform care on their own due to: dementia.    Further, I certify that my clinical findings support that this patient is homebound (i.e. absences from home require considerable and taxing effort and are for medical reasons or [employer] services or infrequently or of short duration when for other reasons) because: Patient is bedbound.    Based on the above findings. I certify that this patient is confined to the home and needs intermittent skilled nursing care, physical therapy and/or speech therapy.  The patient is under my care, and I have initiated the establishment of the plan of care.  This patient will be followed by a physician who will periodically review the plan of care.  Physician/Provider to provide follow up care: Priscilla Inman    Attending [facility] physician (the Medicare certified [place] provider): Kiarra Yu MD  Physician Signature: See electronic signature associated with these discharge orders.  Date: 6/2/2017     Reason for your hospital stay   Order Comments: You were admitted for altered mental status and weakness. Occupations therapy did a cognitive evaluation, which was consistent with possible dementia. They recommended long term care, with memory care. We also had Palliative Care see you to discuss long term goals. Your family stated that you wishes would be to remain in your home. Thus, we have arranged from home hospice. They will meet you at your home at 1 PM today.     Adult [facility] Follow-up and recommended labs and tests   Order Comments: Follow up with primary care provider, Priscilla Inman, as needed, but hospice care will be your primary point of contact at this time.      Appointments on [place] and/or [facility] (with [facility] or [facility] provider or service). Call [phone] if you haven't heard regarding these appointments within 7 days of discharge.     Activity   Order Comments: Your activity upon discharge: activity as tolerated, 24 hours assist. Needs assist of 2 to get out of bed. Turn every 2 hours to avoid bed sores.   Order Specific Question Answer Comments   Is discharge order? Yes      When to contact your care team   Order Comments: Notify hospice of fever, uncontrolled nausea, vomiting, unrelieved pain, bleeding not relieved with pressure, dizziness, chest pain, shortness of breath, loss of consciousness, and any new or concerning symptoms.     Return to the ED as needed     Diet   Order Comments: Follow this diet upon discharge: As tolerated, with assist, notify hospice if any coughing with eating as this would indicated possible aspiration   Order Specific Question Answer Comments   Is discharge order? Yes         Attestation:   I have reviewed today's vital signs, notes, medications, labs and imaging.      ISHA Peralta, CNP  Emergency Department Observation Unit

## 2017-06-03 NOTE — PLAN OF CARE
Problem: Discharge Planning  Goal: Discharge Planning (Adult, OB, Behavioral, Peds)  Outpatient/Observation goals to be met before discharge home:     Observation goals PRIOR TO DISCHARGE  - Vital signs normal or at patient baseline: Yes  - PT/OT consult completed: Yes  - Safe disposition identified: Yes, discharging to home tomorrow at noon  Pt had total feed, ate 50% of her dinner.Repostion Q 2 hrs.

## 2017-06-03 NOTE — PROGRESS NOTES
8:41 AM  Patient seen and examined, electronic medical record reviewed.  Plan discussed with LOW.  Subjective: Patient states that she had difficulty falling asleep.  She does not remember whether or not she ate breakfast.  She denies pain currently or discomfort.  Objective:  Vitals:    06/02/17 1951 06/03/17 0000 06/03/17 0529 06/03/17 0841   BP: 114/76 134/79 121/79 136/90   BP Location: Left arm Left arm Left arm Right arm   Pulse: 91   104   Resp: 16 16 16 16   Temp: 99.7  F (37.6  C) 98.5  F (36.9  C) 98.4  F (36.9  C) 99.3  F (37.4  C)   TempSrc: Oral Oral Oral Oral   SpO2: 98% 97% 95%    Weight:         Chest was clear to auscultation.  Cardiovascular exam with a irregularly irregular rhythm rate controlled.  Abdomen was soft and nontender.  Mental status: Oriented to person but not to place or time.  Results for orders placed or performed during the hospital encounter of 06/01/17   CT Head w/o Contrast    Narrative    CT SCAN OF THE HEAD WITHOUT CONTRAST   6/1/2017 2:17 PM     HISTORY: Altered mental status.    TECHNIQUE:  Axial images of the head and coronal reformations without  IV contrast material. Radiation dose for this scan was reduced using  automated exposure control, adjustment of the mA and/or kV according  to patient size, or iterative reconstruction technique.    COMPARISON: 9/28/2016    FINDINGS:  There is generalized atrophy of the brain.  There is low  attenuation in the white matter of the cerebral hemispheres consistent  with sequelae of small vessel ischemic disease. There is no evidence  of intracranial hemorrhage, mass, acute infarct or anomaly.     The visualized portions of the sinuses and mastoids appear normal.  There is no evidence of trauma.      Impression    IMPRESSION:   1. No acute abnormality.  2.  Atrophy of the brain.  White matter changes consistent with  sequelae of small vessel ischemic disease. This is unchanged.      LUDWIG IQBAL MD   Wrist XR, G/E 3 views, right     Narrative    XR WRIST RT G/E 3 VW 6/1/2017 2:29 PM    COMPARISON: None.    HISTORY: Trauma      Impression    IMPRESSION: Osteopenia about the right wrist. No displaced fractures  are seen. Significant chondrocalcinosis is noted about the right  carpus, most pronounced at the triangular fibrocartilage.    NANCY PAPPAS   XR Chest 1 View    Narrative    CHEST ONE VIEW   6/1/2017 2:28 PM     HISTORY: Weakness.    COMPARISON: 12/14/2009.    FINDINGS: Patient is rotated and off-center on this view. Left  costophrenic angle is excluded. No gross airspace consolidation. Heart  size similar to prior. No pneumothorax.      Impression    IMPRESSION: Limited exam. No radiographic evidence of acute chest  abnormality.    RUBY MEDINA MD   CBC with platelets differential   Result Value Ref Range    WBC 6.5 4.0 - 11.0 10e9/L    RBC Count 4.75 3.8 - 5.2 10e12/L    Hemoglobin 14.7 11.7 - 15.7 g/dL    Hematocrit 42.2 35.0 - 47.0 %    MCV 89 78 - 100 fl    MCH 30.9 26.5 - 33.0 pg    MCHC 34.8 31.5 - 36.5 g/dL    RDW 13.2 10.0 - 15.0 %    Platelet Count 176 150 - 450 10e9/L    Diff Method Automated Method     % Neutrophils 83.7 %    % Lymphocytes 7.7 %    % Monocytes 8.1 %    % Eosinophils 0.0 %    % Basophils 0.2 %    % Immature Granulocytes 0.3 %    Nucleated RBCs 0 0 /100    Absolute Neutrophil 5.5 1.6 - 8.3 10e9/L    Absolute Lymphocytes 0.5 (L) 0.8 - 5.3 10e9/L    Absolute Monocytes 0.5 0.0 - 1.3 10e9/L    Absolute Eosinophils 0.0 0.0 - 0.7 10e9/L    Absolute Basophils 0.0 0.0 - 0.2 10e9/L    Abs Immature Granulocytes 0.0 0 - 0.4 10e9/L    Absolute Nucleated RBC 0.0    Partial thromboplastin time   Result Value Ref Range    PTT 33 22 - 37 sec   INR   Result Value Ref Range    INR 1.00 0.86 - 1.14   Comprehensive metabolic panel   Result Value Ref Range    Sodium 141 133 - 144 mmol/L    Potassium 3.4 3.4 - 5.3 mmol/L    Chloride 102 94 - 109 mmol/L    Carbon Dioxide 31 20 - 32 mmol/L    Anion Gap 8 3 - 14 mmol/L    Glucose 113  (H) 70 - 99 mg/dL    Urea Nitrogen 41 (H) 7 - 30 mg/dL    Creatinine 0.88 0.52 - 1.04 mg/dL    GFR Estimate 60 (L) >60 mL/min/1.7m2    GFR Estimate If Black 72 >60 mL/min/1.7m2    Calcium 9.6 8.5 - 10.1 mg/dL    Bilirubin Total 0.9 0.2 - 1.3 mg/dL    Albumin 3.3 (L) 3.4 - 5.0 g/dL    Protein Total 7.5 6.8 - 8.8 g/dL    Alkaline Phosphatase 72 40 - 150 U/L    ALT 37 0 - 50 U/L    AST 25 0 - 45 U/L   UA with Microscopic   Result Value Ref Range    Color Urine Yellow     Appearance Urine Clear     Glucose Urine Negative NEG mg/dL    Bilirubin Urine Negative NEG    Ketones Urine Negative NEG mg/dL    Specific Gravity Urine 1.020 1.003 - 1.035    Blood Urine Small (A) NEG    pH Urine 6.0 5.0 - 7.0 pH    Protein Albumin Urine 30 (A) NEG mg/dL    Urobilinogen mg/dL Normal 0.0 - 2.0 mg/dL    Nitrite Urine Negative NEG    Leukocyte Esterase Urine Negative NEG    Source Catheterized Urine     WBC Urine 1 0 - 2 /HPF    RBC Urine 1 0 - 2 /HPF    Bacteria Urine Few (A) NEG /HPF    Squamous Epithelial /HPF Urine <1 0 - 1 /HPF    Hyaline Casts 1 0 - 2 /LPF   Troponin I   Result Value Ref Range    Troponin I ES  0.000 - 0.045 ug/L     <0.015  The 99th percentile for upper reference range is 0.045 ug/L.  Troponin values in   the range of 0.045 - 0.120 ug/L may be associated with risks of adverse   clinical events.     Lactic acid   Result Value Ref Range    Lactic Acid 1.7 0.7 - 2.1 mmol/L   TSH with free T4 reflex   Result Value Ref Range    TSH 1.20 0.40 - 4.00 mU/L   CBC with platelets differential   Result Value Ref Range    WBC 6.6 4.0 - 11.0 10e9/L    RBC Count 3.81 3.8 - 5.2 10e12/L    Hemoglobin 11.4 (L) 11.7 - 15.7 g/dL    Hematocrit 33.6 (L) 35.0 - 47.0 %    MCV 88 78 - 100 fl    MCH 29.9 26.5 - 33.0 pg    MCHC 33.9 31.5 - 36.5 g/dL    RDW 13.8 10.0 - 15.0 %    Platelet Count 169 150 - 450 10e9/L    Diff Method Automated Method     % Neutrophils 75.9 %    % Lymphocytes 10.7 %    % Monocytes 12.9 %    % Eosinophils 0.0 %     "% Basophils 0.2 %    % Immature Granulocytes 0.3 %    Nucleated RBCs 0 0 /100    Absolute Neutrophil 5.0 1.6 - 8.3 10e9/L    Absolute Lymphocytes 0.7 (L) 0.8 - 5.3 10e9/L    Absolute Monocytes 0.9 0.0 - 1.3 10e9/L    Absolute Eosinophils 0.0 0.0 - 0.7 10e9/L    Absolute Basophils 0.0 0.0 - 0.2 10e9/L    Abs Immature Granulocytes 0.0 0 - 0.4 10e9/L    Absolute Nucleated RBC 0.0    Basic metabolic panel   Result Value Ref Range    Sodium 140 133 - 144 mmol/L    Potassium 3.1 (L) 3.4 - 5.3 mmol/L    Chloride 106 94 - 109 mmol/L    Carbon Dioxide 24 20 - 32 mmol/L    Anion Gap 9 3 - 14 mmol/L    Glucose 102 (H) 70 - 99 mg/dL    Urea Nitrogen 33 (H) 7 - 30 mg/dL    Creatinine 0.75 0.52 - 1.04 mg/dL    GFR Estimate 72 >60 mL/min/1.7m2    GFR Estimate If Black 87 >60 mL/min/1.7m2    Calcium 8.2 (L) 8.5 - 10.1 mg/dL   Magnesium   Result Value Ref Range    Magnesium 1.6 1.6 - 2.3 mg/dL   Basic metabolic panel   Result Value Ref Range    Sodium 140 133 - 144 mmol/L    Potassium 3.6 3.4 - 5.3 mmol/L    Chloride 108 94 - 109 mmol/L    Carbon Dioxide 24 20 - 32 mmol/L    Anion Gap 8 3 - 14 mmol/L    Glucose 100 (H) 70 - 99 mg/dL    Urea Nitrogen 26 7 - 30 mg/dL    Creatinine 0.75 0.52 - 1.04 mg/dL    GFR Estimate 71 >60 mL/min/1.7m2    GFR Estimate If Black 86 >60 mL/min/1.7m2    Calcium 8.5 8.5 - 10.1 mg/dL   Potassium   Result Value Ref Range    Potassium 4.4 3.4 - 5.3 mmol/L   EKG 12-lead, tracing only   Result Value Ref Range    Interpretation ECG Click View Image link to view waveform and result    Cardiology General Adult IP Consult: Patient to be seen: Routine within 24 hrs; Call back #: [id number]; new onset AF; Consultant may enter orders: Yes    Narrative    Severo Newberry MD     6/2/2017  5:19 PM  [facility] Cardiology Consult    Alicia Cason MRN# [id number]   Age: 89 year old YOB: 1921     Date of Admission:  6/1/2017    Reason for consult: \"new onset afib\"     "   Requesting physician: Kelly Archuleta NP       Level of consult: One-time consult to assist in determining a   diagnosis and to recommend an appropriate treatment plan           Assessment and Impression:   Impression:   Atrial fibrillation.  Unclear time of onset.  The patient has   remained hemodynamically stable throughout her admission and rate   has been stable ranging from 80-100s. She is on home metoprolol   25mg BID, which is controlling her rate appropriately. She has no   prior history of afib per EKG review.  She does have a   significant history of falls per chart review with 3 admissions   related to falls since 2010.  She is not on aspirin or warfarin   per pharmacy medication reconciliation note.       Recommendations:   -Continue home metoprolol 25mg PO BID for rate control.   -No need for rhythm control.   -Would not recommend anticoagulation in this patient given other   comorbidities/history of falls.  -Could consider echocardiogram to evaluate atrial size.     Cardiology will sign off at this time.  Please do not hesitate to   contact us if further questions arise.               Chief Complaint:   Altered mental status.      History is obtained from the electronic health record    Alicia Cason is a 89 y/o F with PMH of anxiety, HTN, HLD, and   DJD presenting to the ED via EMS for altered mental status.  She   was found by family to be altered from her baseline and EMS was   called.  In the ED she was found to be in afib, which was new for   her.  BP and pulse remained stable and was afebrile.  Lactate   1.7, troponin negative and TSH WNL at 1.20.  UA with bacteria and   small blood only.  CT head without acute abnormality.     She was admitted to ED observation service for further   monitoring.      On exam, she was unable to meaningfully participate in review of   systems due to AMS. Cardiology team attempted to call daughter,   Tiff, but was unable to reach.          Past Medical  History:   HTN, HLD, anxiety, DJD          Past Surgical History:   Surgical history reviewed in [facility].  Non-contributory to   current admission.           Social History:     Social History   Substance Use Topics     Smoking status: Never Smoker     Smokeless tobacco: Not on file     Alcohol use No             Family History:   No family history on file.  Family history unable to be reviewed   due to patient condition.           Immunizations:   Immunization status is unknown          Allergies:     Allergies   Allergen Reactions     Atorvastatin Diarrhea     Pravastatin Other (See Comments)     Myalgia's     Prazosin Other (See Comments)     unkown reaction     Simvastatin Diarrhea             Medications:     Current Facility-Administered Medications   Medication     potassium chloride SA (K-DUR/KLOR-CON M) CR tablet 20-40 mEq     potassium chloride (KLOR-CON) Packet 20-40 mEq     potassium chloride 10 mEq in 100 mL intermittent infusion     potassium chloride 10 mEq in 100 mL intermittent infusion with   10 mg lidocaine     potassium chloride 20 mEq in 50 mL intermittent infusion     lidocaine 1 % 1 mL     lidocaine (LMX4) kit     sodium chloride (PF) 0.9% PF flush 3 mL     sodium chloride (PF) 0.9% PF flush 3 mL     lovastatin (MEVACOR) tablet 20 mg     metoprolol (LOPRESSOR) half-tab 25 mg     ranitidine (ZANTAC) tablet 150 mg     acetaminophen (TYLENOL) tablet 650 mg     ondansetron (ZOFRAN-ODT) ODT tab 4 mg    Or     ondansetron (ZOFRAN) injection 4 mg     0.9% sodium chloride infusion             Review of Systems:   The Review of Systems is negative other than noted in the HPI          Physical Exam:     Vitals were reviewed  Patient Vitals for the past 12 hrs:   BP Temp Temp src Pulse Heart Rate Resp SpO2   06/02/17 1611 (!) 142/96 99.3  F (37.4  C) Oral 112 - 16 97 %   06/02/17 1150 111/76 - - 87 - - -   06/02/17 0926 109/67 - - - 92 - -   06/02/17 0808 113/70 97.9  F (36.6  C) Oral - 95 16 96 %      Neck:   supple, symmetrical, trachea midline     Lungs:   No increased work of breathing, good air exchange, clear to   auscultation bilaterally, no crackles or wheezing     Cardiovascular:   irregularly irregular rhythm, normal S1 and S2, no S3, no S4, no   murmur noted, no edema and pulses 2 plus all extermities   bilaterally     Abdomen:   normal bowel sounds, soft, non-distended, non-tender, voluntary   guarding absent and no masses palpated     Constitutional:   awake, alert, minimally following directions, not answering   questions appropriately and no apparent distress             Data:   All laboratory and imaging data in the past 24 hours reviewed  Lab Results   Component Value Date    WBC 6.6 06/02/2017    HGB 11.4 (L) 06/02/2017    HCT 33.6 (L) 06/02/2017     06/02/2017     06/02/2017    POTASSIUM 3.1 (L) 06/02/2017    CHLORIDE 106 06/02/2017    CO2 24 06/02/2017    BUN 33 (H) 06/02/2017    CR 0.75 06/02/2017     (H) 06/02/2017    TROPI  06/01/2017     <0.015  The 99th percentile for upper reference range is 0.045 ug/L.    Troponin values in   the range of 0.045 - 0.120 ug/L may be associated with risks of   adverse   clinical events.      AST 25 06/01/2017    ALT 37 06/01/2017    ALKPHOS 72 06/01/2017    BILITOTAL 0.9 06/01/2017    INR 1.00 06/01/2017        EKG results:   I have reviewed this patient's EKG with the following   interpretation per my read:        Afib, rate 100, QRS 74, QTc 420, no ST elevations/depressions or   T-wave inversions per my read.       Echo 9/28/16  Technically difficult study.  Global and regional left ventricular function is hyperkinetic   with an EF  >70%. This causes a resting intra-cavitary gradient of 35 mmHg.  Right ventricular function, chamber size, wall motion, and   thickness are  normal.  Mild mitral stenosis is present.    Imaging studies per radiology report.      CT head 6/1/17  IMPRESSION:   1. No acute abnormality.  2.  Atrophy of the  brain.  White matter changes consistent with  sequelae of small vessel ischemic disease. This is unchanged.    CXR 6/1/17  FINDINGS: Patient is rotated and off-center on this view. Left  costophrenic angle is excluded. No gross airspace consolidation.   Heart  size similar to prior. No pneumothorax.         IMPRESSION: Limited exam. No radiographic evidence of acute chest  abnormality.    Patient seen and discussed with Dr. Soni.     Severo Newberry MD   PGY-2, IM  Pager: [phone]      Urine Culture Aerobic Bacterial   Result Value Ref Range    Specimen Description Catheterized Urine     Special Requests Specimen received in preservative     Culture Micro No growth     Micro Report Status FINAL 06/02/2017    Blood culture   Result Value Ref Range    Specimen Description Blood Left Arm     Culture Micro No growth after 2 days     Micro Report Status Pending      Assessment/plan: 89-year-old female with a history of hyperlipidemia, hearing loss and hypertension who presented with generalized weakness as well as confusion.  Appreciate cardiology's input regarding atrial fibrillation.  Currently awaiting for a few hospice with consideration for her placement back home.

## 2017-06-03 NOTE — PLAN OF CARE
Problem: Discharge Planning  Goal: Discharge Planning (Adult, OB, Behavioral, Peds)  /90 (BP Location: Right arm)  Pulse 104  Temp 99.3  F (37.4  C) (Oral)  Resp 16  Wt 46.4 kg (102 lb 6 oz)  SpO2 95%  BMI 17.57 kg/m2      Observation goals PRIOR TO DISCHARGE  - Vital signs normal or at patient baseline: Yes  - PT/OT consult completed: OT eval and recs completed. PT pending  - Safe disposition identified: Pending

## 2017-06-03 NOTE — PLAN OF CARE
Problem: Discharge Planning  Goal: Discharge Planning (Adult, OB, Behavioral, Peds)  Outpatient/Observation goals to be met before discharge home:  -Diagnostic tests and consults completed and resulted: No  -Vital signs normal or at patient baseline: Yes  - PT/OT consult completed: OT consult and recommendation completed. PT consult pending.  - Safe disposition identified: No pending work up and care coordination.  /79 (BP Location: Left arm)  Pulse 91  Temp 98.4  F (36.9  C) (Oral)  Resp 16  Wt 46.4 kg (102 lb 6 oz)  SpO2 95%  BMI 17.57 kg/m2

## 2017-06-03 NOTE — PLAN OF CARE
Problem: Discharge Planning  Goal: Discharge Planning (Adult, OB, Behavioral, Peds)  BP 98/60  Pulse 104  Temp 99.7  F (37.6  C) (Oral)  Resp 16  Wt 46.4 kg (102 lb 6 oz)  SpO2 97%  BMI 17.57 kg/m2     Observation goals PRIOR TO DISCHARGE  - Vital signs normal or at patient baseline: Yes  - PT/OT consult completed: Yes  - Safe disposition identified: Yes, discharging to home tomorrow at noon    Addendum: Pt alert to self only, on continuous cardiac monitoring notable for a-fib with few PVCs. Ate 25% of breakfast and lunch; pt is a total feed, continues NS @ 75cc/hr. Up to commode x1 with A2 and pivot, small soft BM. Occ urinary incontinence. T&R q2hr, BA on. Plan to discharge to home tomorrow via stretcher at noon; hospice nurse will meet with pt and family at home at 1pm. Continue to monitor.

## 2017-06-04 NOTE — PROGRESS NOTES
10:48 AM The patient was seen and examined by me and the case was discussed with the LOW. We are in agreement with the assessment and plan.  This is a 89-year-old female with cognitive impairment hypertension anxiety and hearing loss admitted for concern for altered mental status.  Her workup has been negative including infectious electrolyte and head CT.  There is nothing at this point intervene on and she should be discharged home.  Physical exam:  General: She is awake and alert sitting upright in bed minimally interactive  Cardiac: Regular rate and rhythm  Respiratory: Lungs clear    Assessment and plan: 89-year-old female with cognitive impairment likely at functional baseline she is to be discharged home as a hospice patient no evidence for infection electrolyte abnormality or any other modifiable factors affecting her function.

## 2017-06-04 NOTE — PLAN OF CARE
Goal: Discharge Planning (Adult, OB, Behavioral, Peds)  Outpatient/Observation goals to be met before discharge home:      Observation goals PRIOR TO DISCHARGE  - Vital signs normal or at patient baseline: Yes      - PT/OT consult completed: Yes      - Safe disposition identified: Yes, [facility]        Hospice to meet the patient at her home today at 1 pm. The patient's daughter and son live with her. Tolerated 100% Apple sauce during the night. Total feed. Turned and repo every 2 hrs. Denies pain. Can answer simple questions. New IV placed x 2 and she pulled out same due to confusion. Difficult to redirect. Appear A/O x 1 with confusion. Incontinent x 2 of bladder.Remains on continuous cardiac monitoring, A fib with pac, HR 80's to 100. Bed in low position and bed alarm in place, as Pt is unable to utilize call light. /88 (BP Location: Right arm)  Pulse 91  Temp 97.4  F (36.3  C) (Oral)  Resp 16  Wt 46.4 kg (102 lb 6 oz)  SpO2 100%  BMI 17.57 kg/m2  Will continue to monitor.

## 2017-06-04 NOTE — PROGRESS NOTES
Outpatient/Observation goals to be met before discharge home:      Observation goals PRIOR TO DISCHARGE  - Vital signs normal or at patient baseline: Yes      - PT/OT consult completed: Yes      - Safe disposition identified: Yes, discharging to home at noon via stretcher        Hospice to meet the patient at her home tomorrow at 1 pm. The patient's daughter and son live with her. Tolerated 100% Apple sauce . Total feed. Turned and repo every 2 hrs. Denies pain. Can answer simple questions.    Ok to dc home.  Ambulance/transport picked up pt via stretcher.  No meds given other than metoprolol 25mg this a.m.  Voided spont x 1.  incont x 1.    Poor po.  C/o no appetite.  One slice of peach and one applesauce.

## 2017-06-04 NOTE — PROGRESS NOTES
Social Work Services Discharge Note      Patient Name:  Alicia Cason     Anticipated Discharge Date:  Sunday 6/4/17    Discharge Disposition:   Hospice:  In-home   [address] [place]  [address]      Following MD:  PCP is Dr. Priscilla Inman with [facility]; [facility] MD        Additional Services/Equipment Arranged:  [facility]  Justin-On Call Hospice RN  [phone]  Hospice team to meet at pt's home at 1300 to sign pt onto hospice and eval and treat.    ROBER had arranged a stretcher ride with [employer] for 12noon  today.  PCS form completed.    ROBER left vm with Justin this morning re: questions about medications.  ROBER later called Justin who states that he spoke with JUDY Perrin, directly and took care of the matter.       Patient / Family response to discharge plan:  ROBER explained plan to Tiff wright, yesterday and she voiced agreement and understanding.  ROBER called and left vm with Tiff this morning and directed her to call the unit if she has any questions.     Persons notified of above discharge plan:  JUDY Perrin; Justin, On-Call [facility] RN; Tiff wright; unit 6D nursing staff     Staff Discharge Instructions:  Please fax discharge orders and signed hard scripts for any controlled substances.--NO  [facility] has access to [place].  Please print a packet and send with patient.--Not necessary.    CTS Handoff completed:  NO    Medicare Notice of Rights provided to the patient/family:  NO; pt is on OBS status    ARTUR Wall  Weekend   [phone] pager 8:00am-4:30pm  [phone] After-hours pager

## 2017-06-04 NOTE — PLAN OF CARE
Problem: Discharge Planning  Goal: Discharge Planning (Adult, OB, Behavioral, Peds)  Problem: Discharge Planning  Goal: Discharge Planning (Adult, OB, Behavioral, Peds)  blem: Discharge Planning  Goal: Discharge Planning (Adult, OB, Behavioral, Peds)  Outpatient/Observation goals to be met before discharge home:      Observation goals PRIOR TO DISCHARGE  - Vital signs normal or at patient baseline: Yes      - PT/OT consult completed: Yes      - Safe disposition identified: Yes, [facility]        Hospice to meet the patient at her home tomorrow at 1 pm. The patient's daughter and son live with her. Tolerated 100% Apple sauce . Total feed. Turned and repo every 2 hrs. Denies pain. Can answer simple questions. New IV placed. Pt is confused and removed iv.

## 2017-06-04 NOTE — PROGRESS NOTES
Outpatient/Observation goals to be met before discharge home:      Observation goals PRIOR TO DISCHARGE  - Vital signs normal or at patient baseline: Yes      - PT/OT consult completed: Yes      - Safe disposition identified: Yes, discharging to home at noon via [facility]        Hospice to meet the patient at her home tomorrow at 1 pm. The patient's daughter and son live with her. Tolerated 100% Apple sauce . Total feed. Turned and repo every 2 hrs. Denies pain. Can answer simple questions.

## 2017-06-04 NOTE — PLAN OF CARE
Problem: Discharge Planning  Goal: Discharge Planning (Adult, OB, Behavioral, Peds)  blem: Discharge Planning  Goal: Discharge Planning (Adult, OB, Behavioral, Peds)  Outpatient/Observation goals to be met before discharge home:      Observation goals PRIOR TO DISCHARGE  - Vital signs normal or at patient baseline: Yes     - PT/OT consult completed: Yes     - Safe disposition identified: Yes, discharging to home tomorrow at noon via [facility]       Hospice to meet the patient at her home tomorrow at 1 pm. The patient's daughter and son live with her. Tolerated 100% Apple sauce . Total feed. Turned and repo every 2 hrs. Denies pain. Can answer simple questions.  Blood pressure 117/61, pulse 101, temperature 99.1  F (37.3  C), temperature source Oral, resp. rate 16, weight 46.4 kg (102 lb 6 oz), SpO2 98 %, not currently breastfeeding.

## 2017-06-04 NOTE — PLAN OF CARE
Problem: Goal Outcome Summary  Goal: Goal Outcome Summary  Occupational Therapy Discharge Summary     Reason for therapy discharge:    Discharged to Home with hospice and assist from family     Progress towards therapy goal(s). See goals on Care Plan in [facility] electronic health record for goal details.  Goals partially met.  Barriers to achieving goals:   discharge from facility.     Therapy recommendation(s):    No further therapy is recommended.  recommend 24 hour assist available after discharge